# Patient Record
Sex: MALE | Race: OTHER | HISPANIC OR LATINO
[De-identification: names, ages, dates, MRNs, and addresses within clinical notes are randomized per-mention and may not be internally consistent; named-entity substitution may affect disease eponyms.]

---

## 2024-10-11 PROBLEM — Z00.00 ENCOUNTER FOR PREVENTIVE HEALTH EXAMINATION: Status: ACTIVE | Noted: 2024-10-11

## 2024-10-23 ENCOUNTER — RESULT REVIEW (OUTPATIENT)
Age: 69
End: 2024-10-23

## 2024-10-23 ENCOUNTER — APPOINTMENT (OUTPATIENT)
Dept: SPINE | Facility: CLINIC | Age: 69
End: 2024-10-23
Payer: MEDICARE

## 2024-10-23 ENCOUNTER — OUTPATIENT (OUTPATIENT)
Dept: OUTPATIENT SERVICES | Facility: HOSPITAL | Age: 69
LOS: 1 days | End: 2024-10-23
Payer: MEDICAID

## 2024-10-23 VITALS
SYSTOLIC BLOOD PRESSURE: 123 MMHG | DIASTOLIC BLOOD PRESSURE: 69 MMHG | HEIGHT: 69 IN | TEMPERATURE: 97.5 F | RESPIRATION RATE: 18 BRPM | BODY MASS INDEX: 25.48 KG/M2 | WEIGHT: 172 LBS | HEART RATE: 85 BPM | OXYGEN SATURATION: 98 %

## 2024-10-23 DIAGNOSIS — M43.17 SPONDYLOLISTHESIS, LUMBOSACRAL REGION: ICD-10-CM

## 2024-10-23 PROCEDURE — 99203 OFFICE O/P NEW LOW 30 MIN: CPT

## 2024-10-23 PROCEDURE — 72083 X-RAY EXAM ENTIRE SPI 4/5 VW: CPT | Mod: 26

## 2024-10-23 PROCEDURE — 72083 X-RAY EXAM ENTIRE SPI 4/5 VW: CPT

## 2024-12-27 ENCOUNTER — APPOINTMENT (OUTPATIENT)
Dept: MRI IMAGING | Facility: HOSPITAL | Age: 69
End: 2024-12-27

## 2024-12-27 ENCOUNTER — APPOINTMENT (OUTPATIENT)
Dept: CT IMAGING | Facility: HOSPITAL | Age: 69
End: 2024-12-27

## 2024-12-27 ENCOUNTER — OUTPATIENT (OUTPATIENT)
Dept: OUTPATIENT SERVICES | Facility: HOSPITAL | Age: 69
LOS: 1 days | End: 2024-12-27
Payer: MEDICAID

## 2024-12-27 PROCEDURE — 72131 CT LUMBAR SPINE W/O DYE: CPT

## 2024-12-27 PROCEDURE — 72148 MRI LUMBAR SPINE W/O DYE: CPT

## 2024-12-27 PROCEDURE — 72148 MRI LUMBAR SPINE W/O DYE: CPT | Mod: 26

## 2024-12-27 PROCEDURE — 72131 CT LUMBAR SPINE W/O DYE: CPT | Mod: 26

## 2024-12-27 PROCEDURE — 72082 X-RAY EXAM ENTIRE SPI 2/3 VW: CPT | Mod: 26

## 2024-12-27 PROCEDURE — 72082 X-RAY EXAM ENTIRE SPI 2/3 VW: CPT

## 2025-01-06 DIAGNOSIS — M43.16 SPONDYLOLISTHESIS, LUMBAR REGION: ICD-10-CM

## 2025-01-06 DIAGNOSIS — M51.369 OTHER INTERVERTEBRAL DISC DEGENERATION, LUMBAR REGION WITHOUT MENTION OF LUMBAR BACK PAIN OR LOWER EXTREMITY PAIN: ICD-10-CM

## 2025-01-06 DIAGNOSIS — M40.56 LORDOSIS, UNSPECIFIED, LUMBAR REGION: ICD-10-CM

## 2025-01-06 DIAGNOSIS — M48.07 SPINAL STENOSIS, LUMBOSACRAL REGION: ICD-10-CM

## 2025-01-06 DIAGNOSIS — M47.816 SPONDYLOSIS WITHOUT MYELOPATHY OR RADICULOPATHY, LUMBAR REGION: ICD-10-CM

## 2025-01-06 DIAGNOSIS — M51.26 OTHER INTERVERTEBRAL DISC DISPLACEMENT, LUMBAR REGION: ICD-10-CM

## 2025-01-06 DIAGNOSIS — M99.84 OTHER BIOMECHANICAL LESIONS OF SACRAL REGION: ICD-10-CM

## 2025-01-06 DIAGNOSIS — M48.061 SPINAL STENOSIS, LUMBAR REGION WITHOUT NEUROGENIC CLAUDICATION: ICD-10-CM

## 2025-01-06 DIAGNOSIS — M99.83 OTHER BIOMECHANICAL LESIONS OF LUMBAR REGION: ICD-10-CM

## 2025-01-06 DIAGNOSIS — N20.0 CALCULUS OF KIDNEY: ICD-10-CM

## 2025-01-06 DIAGNOSIS — Z01.818 ENCOUNTER FOR OTHER PREPROCEDURAL EXAMINATION: ICD-10-CM

## 2025-01-06 DIAGNOSIS — M43.17 SPONDYLOLISTHESIS, LUMBOSACRAL REGION: ICD-10-CM

## 2025-01-31 VITALS
SYSTOLIC BLOOD PRESSURE: 147 MMHG | OXYGEN SATURATION: 99 % | DIASTOLIC BLOOD PRESSURE: 90 MMHG | WEIGHT: 195.77 LBS | HEART RATE: 78 BPM | RESPIRATION RATE: 17 BRPM | TEMPERATURE: 98 F | HEIGHT: 71 IN

## 2025-02-02 RX ORDER — POVIDONE-IODINE 0.01 ML/1
1 SWAB TOPICAL ONCE
Refills: 0 | Status: COMPLETED | OUTPATIENT
Start: 2025-02-03 | End: 2025-02-03

## 2025-02-03 ENCOUNTER — INPATIENT (INPATIENT)
Facility: HOSPITAL | Age: 70
LOS: 3 days | Discharge: HOME HEALTH | DRG: 402 | End: 2025-02-07
Attending: NEUROLOGICAL SURGERY | Admitting: NEUROLOGICAL SURGERY
Payer: MEDICAID

## 2025-02-03 ENCOUNTER — TRANSCRIPTION ENCOUNTER (OUTPATIENT)
Age: 70
End: 2025-02-03

## 2025-02-03 ENCOUNTER — APPOINTMENT (OUTPATIENT)
Dept: SPINE | Facility: HOSPITAL | Age: 70
End: 2025-02-03

## 2025-02-03 DIAGNOSIS — Z98.890 OTHER SPECIFIED POSTPROCEDURAL STATES: Chronic | ICD-10-CM

## 2025-02-03 LAB
ANION GAP SERPL CALC-SCNC: 10 MMOL/L — SIGNIFICANT CHANGE UP (ref 5–17)
BASOPHILS # BLD AUTO: 0.01 K/UL — SIGNIFICANT CHANGE UP (ref 0–0.2)
BASOPHILS NFR BLD AUTO: 0.1 % — SIGNIFICANT CHANGE UP (ref 0–2)
BLD GP AB SCN SERPL QL: NEGATIVE — SIGNIFICANT CHANGE UP
BUN SERPL-MCNC: 17 MG/DL — SIGNIFICANT CHANGE UP (ref 7–23)
CALCIUM SERPL-MCNC: 8.6 MG/DL — SIGNIFICANT CHANGE UP (ref 8.4–10.5)
CHLORIDE SERPL-SCNC: 103 MMOL/L — SIGNIFICANT CHANGE UP (ref 96–108)
CO2 SERPL-SCNC: 23 MMOL/L — SIGNIFICANT CHANGE UP (ref 22–31)
CREAT SERPL-MCNC: 0.85 MG/DL — SIGNIFICANT CHANGE UP (ref 0.5–1.3)
EGFR: 95 ML/MIN/1.73M2 — SIGNIFICANT CHANGE UP
EOSINOPHIL # BLD AUTO: 0.05 K/UL — SIGNIFICANT CHANGE UP (ref 0–0.5)
EOSINOPHIL NFR BLD AUTO: 0.7 % — SIGNIFICANT CHANGE UP (ref 0–6)
GLUCOSE SERPL-MCNC: 145 MG/DL — HIGH (ref 70–99)
HCT VFR BLD CALC: 38.6 % — LOW (ref 39–50)
HGB BLD-MCNC: 13.6 G/DL — SIGNIFICANT CHANGE UP (ref 13–17)
IMM GRANULOCYTES NFR BLD AUTO: 0.5 % — SIGNIFICANT CHANGE UP (ref 0–0.9)
LYMPHOCYTES # BLD AUTO: 0.6 K/UL — LOW (ref 1–3.3)
LYMPHOCYTES # BLD AUTO: 7.9 % — LOW (ref 13–44)
MCHC RBC-ENTMCNC: 31.6 PG — SIGNIFICANT CHANGE UP (ref 27–34)
MCHC RBC-ENTMCNC: 35.2 G/DL — SIGNIFICANT CHANGE UP (ref 32–36)
MCV RBC AUTO: 89.6 FL — SIGNIFICANT CHANGE UP (ref 80–100)
MONOCYTES # BLD AUTO: 0.17 K/UL — SIGNIFICANT CHANGE UP (ref 0–0.9)
MONOCYTES NFR BLD AUTO: 2.2 % — SIGNIFICANT CHANGE UP (ref 2–14)
NEUTROPHILS # BLD AUTO: 6.76 K/UL — SIGNIFICANT CHANGE UP (ref 1.8–7.4)
NEUTROPHILS NFR BLD AUTO: 88.6 % — HIGH (ref 43–77)
NRBC # BLD: 0 /100 WBCS — SIGNIFICANT CHANGE UP (ref 0–0)
NRBC BLD-RTO: 0 /100 WBCS — SIGNIFICANT CHANGE UP (ref 0–0)
PLATELET # BLD AUTO: 158 K/UL — SIGNIFICANT CHANGE UP (ref 150–400)
POTASSIUM SERPL-MCNC: SIGNIFICANT CHANGE UP MMOL/L (ref 3.5–5.3)
POTASSIUM SERPL-SCNC: SIGNIFICANT CHANGE UP MMOL/L (ref 3.5–5.3)
RBC # BLD: 4.31 M/UL — SIGNIFICANT CHANGE UP (ref 4.2–5.8)
RBC # FLD: 13.1 % — SIGNIFICANT CHANGE UP (ref 10.3–14.5)
RH IG SCN BLD-IMP: NEGATIVE — SIGNIFICANT CHANGE UP
SODIUM SERPL-SCNC: 136 MMOL/L — SIGNIFICANT CHANGE UP (ref 135–145)
WBC # BLD: 7.63 K/UL — SIGNIFICANT CHANGE UP (ref 3.8–10.5)
WBC # FLD AUTO: 7.63 K/UL — SIGNIFICANT CHANGE UP (ref 3.8–10.5)

## 2025-02-03 PROCEDURE — 22214 INCIS 1 VERTEBRAL SEG LUMBAR: CPT

## 2025-02-03 PROCEDURE — 22840 INSERT SPINE FIXATION DEVICE: CPT

## 2025-02-03 PROCEDURE — 22633 ARTHRD CMBN 1NTRSPC LUMBAR: CPT

## 2025-02-03 PROCEDURE — 22853 INSJ BIOMECHANICAL DEVICE: CPT

## 2025-02-03 DEVICE — FLOSEAL NT 5ML: Type: IMPLANTABLE DEVICE | Status: FUNCTIONAL

## 2025-02-03 DEVICE — ROD T1 30MM: Type: IMPLANTABLE DEVICE | Status: FUNCTIONAL

## 2025-02-03 DEVICE — SURGIFOAM PAD 8CM X 12.5CM X 10MM (100): Type: IMPLANTABLE DEVICE | Status: FUNCTIONAL

## 2025-02-03 DEVICE — SCREW SOLERA 7.5X50MM: Type: IMPLANTABLE DEVICE | Status: FUNCTIONAL

## 2025-02-03 DEVICE — SCREW SET: Type: IMPLANTABLE DEVICE | Status: FUNCTIONAL

## 2025-02-03 DEVICE — GRAFT I-FACTOR PUTTY 5CC: Type: IMPLANTABLE DEVICE | Status: FUNCTIONAL

## 2025-02-03 DEVICE — SCREW MAS 6.5X45MM: Type: IMPLANTABLE DEVICE | Status: FUNCTIONAL

## 2025-02-03 DEVICE — IMPLANTABLE DEVICE: Type: IMPLANTABLE DEVICE | Status: FUNCTIONAL

## 2025-02-03 RX ORDER — PREGABALIN CAPSULES, CV 225 MG/1
50 CAPSULE ORAL THREE TIMES A DAY
Refills: 0 | Status: DISCONTINUED | OUTPATIENT
Start: 2025-02-03 | End: 2025-02-05

## 2025-02-03 RX ORDER — DM/PSEUDOEPHED/ACETAMINOPHEN 10-30-250
15 CAPSULE ORAL ONCE
Refills: 0 | Status: DISCONTINUED | OUTPATIENT
Start: 2025-02-03 | End: 2025-02-03

## 2025-02-03 RX ORDER — AMLODIPINE BESYLATE 5 MG
10 TABLET ORAL DAILY
Refills: 0 | Status: DISCONTINUED | OUTPATIENT
Start: 2025-02-04 | End: 2025-02-07

## 2025-02-03 RX ORDER — APREPITANT 40 MG/1
40 CAPSULE ORAL ONCE
Refills: 0 | Status: COMPLETED | OUTPATIENT
Start: 2025-02-03 | End: 2025-02-03

## 2025-02-03 RX ORDER — INSULIN LISPRO 100/ML
VIAL (ML) SUBCUTANEOUS
Refills: 0 | Status: DISCONTINUED | OUTPATIENT
Start: 2025-02-03 | End: 2025-02-03

## 2025-02-03 RX ORDER — GLUCAGON 3 MG/1
1 POWDER NASAL ONCE
Refills: 0 | Status: DISCONTINUED | OUTPATIENT
Start: 2025-02-03 | End: 2025-02-03

## 2025-02-03 RX ORDER — ACETAMINOPHEN 160 MG/5ML
1000 SUSPENSION ORAL ONCE
Refills: 0 | Status: COMPLETED | OUTPATIENT
Start: 2025-02-03 | End: 2025-02-03

## 2025-02-03 RX ORDER — OXYCODONE HYDROCHLORIDE 30 MG/1
5 TABLET ORAL EVERY 4 HOURS
Refills: 0 | Status: DISCONTINUED | OUTPATIENT
Start: 2025-02-03 | End: 2025-02-07

## 2025-02-03 RX ORDER — OXYCODONE HYDROCHLORIDE 30 MG/1
10 TABLET ORAL EVERY 4 HOURS
Refills: 0 | Status: DISCONTINUED | OUTPATIENT
Start: 2025-02-03 | End: 2025-02-07

## 2025-02-03 RX ORDER — DM/PSEUDOEPHED/ACETAMINOPHEN 10-30-250
25 CAPSULE ORAL ONCE
Refills: 0 | Status: DISCONTINUED | OUTPATIENT
Start: 2025-02-03 | End: 2025-02-03

## 2025-02-03 RX ORDER — MECOBAL/LEVOMEFOLAT CA/B6 PHOS 2-3-35 MG
1 TABLET ORAL DAILY
Refills: 0 | Status: DISCONTINUED | OUTPATIENT
Start: 2025-02-03 | End: 2025-02-07

## 2025-02-03 RX ORDER — METHOCARBAMOL 500 MG
500 TABLET ORAL EVERY 8 HOURS
Refills: 0 | Status: DISCONTINUED | OUTPATIENT
Start: 2025-02-03 | End: 2025-02-05

## 2025-02-03 RX ORDER — ANTISEPTIC SURGICAL SCRUB 0.04 MG/ML
1 SOLUTION TOPICAL ONCE
Refills: 0 | Status: COMPLETED | OUTPATIENT
Start: 2025-02-03 | End: 2025-02-03

## 2025-02-03 RX ORDER — ACETAMINOPHEN 160 MG/5ML
1000 SUSPENSION ORAL EVERY 8 HOURS
Refills: 0 | Status: DISCONTINUED | OUTPATIENT
Start: 2025-02-03 | End: 2025-02-06

## 2025-02-03 RX ORDER — POLYETHYLENE GLYCOL 3350 17 G/17G
17 POWDER, FOR SOLUTION ORAL DAILY
Refills: 0 | Status: DISCONTINUED | OUTPATIENT
Start: 2025-02-03 | End: 2025-02-05

## 2025-02-03 RX ORDER — BACTERIOSTATIC SODIUM CHLORIDE 0.9 %
1000 VIAL (ML) INJECTION
Refills: 0 | Status: DISCONTINUED | OUTPATIENT
Start: 2025-02-03 | End: 2025-02-04

## 2025-02-03 RX ORDER — SODIUM CHLORIDE 9 G/ML
1000 INJECTION, SOLUTION INTRAVENOUS
Refills: 0 | Status: DISCONTINUED | OUTPATIENT
Start: 2025-02-03 | End: 2025-02-03

## 2025-02-03 RX ORDER — PANTOPRAZOLE 20 MG/1
40 TABLET, DELAYED RELEASE ORAL
Refills: 0 | Status: DISCONTINUED | OUTPATIENT
Start: 2025-02-03 | End: 2025-02-05

## 2025-02-03 RX ORDER — SENNOSIDES 8.6 MG
2 TABLET ORAL
Refills: 0 | Status: DISCONTINUED | OUTPATIENT
Start: 2025-02-03 | End: 2025-02-07

## 2025-02-03 RX ORDER — INSULIN LISPRO 100/ML
VIAL (ML) SUBCUTANEOUS
Refills: 0 | Status: DISCONTINUED | OUTPATIENT
Start: 2025-02-03 | End: 2025-02-07

## 2025-02-03 RX ORDER — ONDANSETRON 4 MG/1
4 TABLET, ORALLY DISINTEGRATING ORAL EVERY 6 HOURS
Refills: 0 | Status: DISCONTINUED | OUTPATIENT
Start: 2025-02-03 | End: 2025-02-06

## 2025-02-03 RX ORDER — CEFAZOLIN SODIUM IN 0.9 % NACL 2 G/10 ML
2000 SYRINGE (ML) INTRAVENOUS EVERY 8 HOURS
Refills: 0 | Status: COMPLETED | OUTPATIENT
Start: 2025-02-03 | End: 2025-02-04

## 2025-02-03 RX ORDER — HYDROMORPHONE HYDROCHLORIDE 4 MG/ML
0.5 INJECTION, SOLUTION INTRAMUSCULAR; INTRAVENOUS; SUBCUTANEOUS
Refills: 0 | Status: DISCONTINUED | OUTPATIENT
Start: 2025-02-03 | End: 2025-02-04

## 2025-02-03 RX ORDER — DM/PSEUDOEPHED/ACETAMINOPHEN 10-30-250
12.5 CAPSULE ORAL ONCE
Refills: 0 | Status: DISCONTINUED | OUTPATIENT
Start: 2025-02-03 | End: 2025-02-03

## 2025-02-03 RX ADMIN — Medication 100 MILLIGRAM(S): at 19:56

## 2025-02-03 RX ADMIN — APREPITANT 40 MILLIGRAM(S): 40 CAPSULE ORAL at 10:24

## 2025-02-03 RX ADMIN — OXYCODONE HYDROCHLORIDE 5 MILLIGRAM(S): 30 TABLET ORAL at 21:40

## 2025-02-03 RX ADMIN — HYDROMORPHONE HYDROCHLORIDE 0.5 MILLIGRAM(S): 4 INJECTION, SOLUTION INTRAMUSCULAR; INTRAVENOUS; SUBCUTANEOUS at 18:43

## 2025-02-03 RX ADMIN — OXYCODONE HYDROCHLORIDE 5 MILLIGRAM(S): 30 TABLET ORAL at 20:40

## 2025-02-03 RX ADMIN — ACETAMINOPHEN 1000 MILLIGRAM(S): 160 SUSPENSION ORAL at 22:12

## 2025-02-03 RX ADMIN — PREGABALIN CAPSULES, CV 50 MILLIGRAM(S): 225 CAPSULE ORAL at 22:17

## 2025-02-03 RX ADMIN — Medication 1 TABLET(S): at 19:16

## 2025-02-03 RX ADMIN — ACETAMINOPHEN 1000 MILLIGRAM(S): 160 SUSPENSION ORAL at 10:24

## 2025-02-03 RX ADMIN — ANTISEPTIC SURGICAL SCRUB 1 APPLICATION(S): 0.04 SOLUTION TOPICAL at 10:10

## 2025-02-03 RX ADMIN — Medication 90 MILLILITER(S): at 16:44

## 2025-02-03 RX ADMIN — OXYCODONE HYDROCHLORIDE 10 MILLIGRAM(S): 30 TABLET ORAL at 17:37

## 2025-02-03 RX ADMIN — HYDROMORPHONE HYDROCHLORIDE 0.5 MILLIGRAM(S): 4 INJECTION, SOLUTION INTRAMUSCULAR; INTRAVENOUS; SUBCUTANEOUS at 16:22

## 2025-02-03 RX ADMIN — POVIDONE-IODINE 1 APPLICATION(S): 0.01 SWAB TOPICAL at 10:40

## 2025-02-03 RX ADMIN — Medication 2 TABLET(S): at 22:12

## 2025-02-03 RX ADMIN — HYDROMORPHONE HYDROCHLORIDE 0.5 MILLIGRAM(S): 4 INJECTION, SOLUTION INTRAMUSCULAR; INTRAVENOUS; SUBCUTANEOUS at 16:59

## 2025-02-03 RX ADMIN — OXYCODONE HYDROCHLORIDE 10 MILLIGRAM(S): 30 TABLET ORAL at 18:07

## 2025-02-03 RX ADMIN — HYDROMORPHONE HYDROCHLORIDE 0.5 MILLIGRAM(S): 4 INJECTION, SOLUTION INTRAMUSCULAR; INTRAVENOUS; SUBCUTANEOUS at 16:37

## 2025-02-03 RX ADMIN — Medication 500 MILLIGRAM(S): at 22:12

## 2025-02-03 RX ADMIN — Medication 4: at 22:33

## 2025-02-03 RX ADMIN — HYDROMORPHONE HYDROCHLORIDE 0.5 MILLIGRAM(S): 4 INJECTION, SOLUTION INTRAMUSCULAR; INTRAVENOUS; SUBCUTANEOUS at 18:26

## 2025-02-03 RX ADMIN — POLYETHYLENE GLYCOL 3350 17 GRAM(S): 17 POWDER, FOR SOLUTION ORAL at 19:16

## 2025-02-03 RX ADMIN — HYDROMORPHONE HYDROCHLORIDE 0.5 MILLIGRAM(S): 4 INJECTION, SOLUTION INTRAMUSCULAR; INTRAVENOUS; SUBCUTANEOUS at 16:44

## 2025-02-03 RX ADMIN — PANTOPRAZOLE 40 MILLIGRAM(S): 20 TABLET, DELAYED RELEASE ORAL at 19:16

## 2025-02-03 NOTE — PHYSICAL THERAPY INITIAL EVALUATION ADULT - PERTINENT HX OF CURRENT PROBLEM, REHAB EVAL
Pt is a 70 yo male with PMHx HTN, GERD, DM2 diet-controlled with L5-S1 Spondylolisthesis; s/p L5-S1 laminectomy, TLIF, PSF on 2/3/25.

## 2025-02-03 NOTE — PHYSICAL THERAPY INITIAL EVALUATION ADULT - LIGHT TOUCH SENSATION, LLE, REHAB EVAL
intact except pt complains slight numbness at lateral aspect of thigh (Pt reports this is chronic condition)/mild impairment

## 2025-02-03 NOTE — PHYSICAL THERAPY INITIAL EVALUATION ADULT - GENERAL OBSERVATIONS, REHAB EVAL
Pt received semi supine in bed with +IV, +EKG, +b/l SCDs, +[place], +HV x 1, + JESUS drain, +NC~2L, NAD. Pt left as found, NAD, drains intact, AARON Gutierrez made aware.

## 2025-02-03 NOTE — PHYSICAL THERAPY INITIAL EVALUATION ADULT - LIGHT TOUCH SENSATION, RLE, REHAB EVAL
intact except Lateral aspect of thigh and posterior plantar aspect of the foot numbness(pt stated that this is chronic)/mild impairment

## 2025-02-03 NOTE — H&P ADULT - HISTORY OF PRESENT ILLNESS
Taken from outpatient neurosurgery note " 68 y/o male with hx of HTN coming in for lower back pain. Pain radiating to bilateral posterior lower extremities (L>R). Reporting weakness of the left lower extremity. "     Taken from outpatient neurosurgery note " 68 y/o male with hx of HTN coming in for lower back pain. Pain radiating to bilateral posterior lower extremities (L>R). Reporting weakness of the left lower extremity. "  ,   68 y/o male with PMHx DM diet controlled (HgbA1c 6.7%), HTN, GERD, presents for spine surgery.  He reports severe back pain and left greater than right leg pain.  He ambulates with a cane.  He reports numbess into both legs and reports left leg weakness when trying to walk.  He denies bowel or bladder changes, saddle anesthesia.  He has not taken any pain medication in the past month because it hasn't been helping.

## 2025-02-03 NOTE — H&P ADULT - NSHPPHYSICALEXAM_GEN_ALL_CORE
Constitutional: 68 y/o male awake, alert in no acute distress.  Eyes:  Sclera anicteric, conjunctiva noninjected.  ENMT: Oropharyngeal mucosa moist, pink. Tongue midline.    Respiratory: Clear to auscultation bilaterally.  No rales, rhonchi, wheezes.  Cardiovascular: Regular rate and rhythm.  S1, S2 heard.  Gastrointestinal:  Soft, nontender, nondistended.  +BS.  Genitourinary:  Deferred.  Rectal: Deferred.  Vascular: Extremities warm, no ulcers, no discoloration of skin.   Neurological: Gen: AA&O x 3, conversant, appropriate.      CN II-XII grossly intact.    Motor: SHI x 4, 5/5 throughout UE/LE.    Sens: Sensation intact to light touch throughout.    Plantar downgoing bilaterally.  No clonus.      No pronator drift, no dysmetria.  Skin: Warm, dry, no erythema. Constitutional: 68 y/o male awake, alert in no acute distress.  Eyes:  Sclera anicteric, conjunctiva noninjected.  ENMT: Oropharyngeal mucosa moist, pink. Tongue midline.    Respiratory: Clear to auscultation bilaterally.  No rales, rhonchi, wheezes.  Cardiovascular: Regular rate and rhythm.  S1, S2 heard.  Gastrointestinal:  Soft, nontender, nondistended.  +BS.  Genitourinary:  Deferred.  Rectal: Deferred.  Vascular: Extremities warm, no ulcers, no discoloration of skin.   Neurological: Gen: AA&O x 3, conversant, appropriate.      CN II-XII grossly intact.    Motor: SHI x 4, 5/5 throughout UE/LE except bilateral LE 4+/5 throughout.    Sens: Sensation intact to light touch throughout.    Plantar downgoing bilaterally.  No clonus.      No pronator drift, no dysmetria.  Skin: Warm, dry, no erythema.

## 2025-02-03 NOTE — PROGRESS NOTE ADULT - ATTENDING COMMENTS
I have personally reviewed the above clinical note and all of its components and:  [ x] agree with the above assessment and plan without modifications.  [ ] agree with the above with modifications made to the assessment and/or plan of care.  [ ] disagree with the above with significant modifications as listed below:

## 2025-02-03 NOTE — PROGRESS NOTE ADULT - SUBJECTIVE AND OBJECTIVE BOX
NEUROSURGERY POST OP NOTE:    POD# 0 S/P posterior lumbar laminectomy with fusion    S: Patient states he is in a lot of pain. He is unable to describe or rate the pain, but he says it is mostly in his back. The patient denies fevers, chills, dizziness or nausea.      T(C): 36.4 (02-03-25 @ 15:49), Max: 36.5 (02-03-25 @ 10:02)  HR: 55 (02-03-25 @ 16:49) (55 - 78)  BP: 124/76 (02-03-25 @ 16:49) (120/76 - 147/90)  RR: 18 (02-03-25 @ 16:49) (13 - 22)  SpO2: 93% (02-03-25 @ 16:49) (93% - 100%)      02-03-25 @ 07:01  -  02-03-25 @ 17:14  --------------------------------------------------------  IN: 180 mL / OUT: 100 mL / NET: 80 mL        acetaminophen     Tablet .. 1000 milliGRAM(s) Oral every 8 hours  ceFAZolin   IVPB 2000 milliGRAM(s) IV Intermittent every 8 hours  dextrose 5%. 1000 milliLiter(s) IV Continuous <Continuous>  dextrose 5%. 1000 milliLiter(s) IV Continuous <Continuous>  dextrose 50% Injectable 25 Gram(s) IV Push once  dextrose 50% Injectable 12.5 Gram(s) IV Push once  dextrose 50% Injectable 25 Gram(s) IV Push once  dextrose Oral Gel 15 Gram(s) Oral once PRN  glucagon  Injectable 1 milliGRAM(s) IntraMuscular once  HYDROmorphone  Injectable 0.5 milliGRAM(s) IV Push every 15 minutes PRN  insulin lispro (ADMELOG) corrective regimen sliding scale   SubCutaneous three times a day before meals  methocarbamol 500 milliGRAM(s) Oral every 8 hours  multivitamin 1 Tablet(s) Oral daily  ondansetron   Disintegrating Tablet 4 milliGRAM(s) Oral every 6 hours  oxyCODONE    IR 10 milliGRAM(s) Oral every 4 hours PRN  oxyCODONE    IR 5 milliGRAM(s) Oral every 4 hours PRN  pantoprazole    Tablet 40 milliGRAM(s) Oral before breakfast  polyethylene glycol 3350 17 Gram(s) Oral daily  pregabalin 50 milliGRAM(s) Oral three times a day  senna 2 Tablet(s) Oral two times a day  sodium chloride 0.9%. 1000 milliLiter(s) IV Continuous <Continuous>      RADIOLOGY:     Exam:General - NAD, Alert and oriented x 3,   Eyes - PERRLA, EOM intact  Neck - No lymphadenopathy  Cardiovascular - RRR no m/r/g, no JVD  Lungs - Clear to auscltation, no use of acessory muscles, no crackles or wheezes.  Skin - No rashes, skin warm and dry, no erythematous areas. Incision near L4/5 does not show signs of infection.  Abdomen - Normal bowel sounds, abdomen soft and nontender  Genito Urinary – Genital exam not performed since complaints not related.  Extremeties - No edema, cyanosis or clubbing  Neurological – No focal deficits or clonus. UE EDGAR 5/5 strength, LE EDGAR hip flexion 3/5, knee extension EDGAR 4/5, and rest of LE EDGAR exam 5/5. Sensation equal and intact.    WOUND/DRAINS: 1 posterior drain at the incision site. Minimal output ~5cc    DEVICES:       Assessment: 68 y/o male with PMHx HTN, GERD, DM2 diet-controlled presents with lower back pain with pain radiating to bilateral posterior lower extremities (L>R) and associated LLE weakness. L5-S1 laminectomy and TLIF, now s/p PSF.    Plan:   Neuro:   - neuro/vitals q4hr  - pain control with Oxy, Dilaudid, Robaxin prn  - pending post op xrays  - x1 deep HMV, x1 superficial JESUS per plastics, monitor output   - No heavy lifting for next 6 weeks    Cards:  - SBP <160  - hx HTN: c/w home amlodipine 5 mg, valsartan 160 mg     RA:  - RA  - IS     GI:  - ADAT  - bowel regimen, pending BM  - hx GERD: c/w home omeprazole (need to confirm if pt still takes)     Renal:  - IVF while ADAT  - +rodriguez -> dc in AM  - Monitor In's and Out's. Has not voided yet.    Endo:  - hx T2DM: ISS  - f/u A1C    Heme:  - SCDs, hold SQL i/s/o fresh post op    ID:  - afebrile  - post op ancef    Dispo: regional, full code, pending PT/OT    Discussed with Dr. Pizarro        Assessment:  Present when checked    []  GCS  E   V  M     Heart Failure: []Acute, [] acute on chronic , []chronic  Heart Failure:  [] Diastolic (HFpEF), [] Systolic (HFrEF), []Combined (HFpEF and HFrEF), [] RHF, [] Pulm HTN, [] Other    [] JAYA, [] ATN, [] AIN, [] other  [] CKD1, [] CKD2, [] CKD 3, [] CKD 4, [] CKD 5, []ESRD    Encephalopathy: [] Metabolic, [] Hepatic, [] toxic, [] Neurological, [] Other    Abnormal Nurtitional Status: [] malnurtition (see nutrition note), [ ]underweight: BMI < 19, [] morbid obesity: BMI >40, [] Cachexia    [] Sepsis  [] hypovolemic shock,[] cardiogenic shock, [] hemorrhagic shock, [] neuogenic shock  [] Acute Respiratory Failure  []Cerebral edema, [] Brain compression/ herniation,   [] Functional quadriplegia  [] Acute blood loss anemia       NEUROSURGERY POST OP NOTE:    POD# 0 S/P L5-S1 laminectomy, TLIF, PSF    S: Patient states he is in a lot of pain. He is unable to describe or rate the pain, but he says it is mostly in his back. The patient denies fevers, chills, SOB, dizziness or nausea.      T(C): 36.4 (02-03-25 @ 15:49), Max: 36.5 (02-03-25 @ 10:02)  HR: 55 (02-03-25 @ 16:49) (55 - 78)  BP: 124/76 (02-03-25 @ 16:49) (120/76 - 147/90)  RR: 18 (02-03-25 @ 16:49) (13 - 22)  SpO2: 93% (02-03-25 @ 16:49) (93% - 100%)      02-03-25 @ 07:01  -  02-03-25 @ 17:14  --------------------------------------------------------  IN: 180 mL / OUT: 100 mL / NET: 80 mL        acetaminophen     Tablet .. 1000 milliGRAM(s) Oral every 8 hours  ceFAZolin   IVPB 2000 milliGRAM(s) IV Intermittent every 8 hours  dextrose 5%. 1000 milliLiter(s) IV Continuous <Continuous>  dextrose 5%. 1000 milliLiter(s) IV Continuous <Continuous>  dextrose 50% Injectable 25 Gram(s) IV Push once  dextrose 50% Injectable 12.5 Gram(s) IV Push once  dextrose 50% Injectable 25 Gram(s) IV Push once  dextrose Oral Gel 15 Gram(s) Oral once PRN  glucagon  Injectable 1 milliGRAM(s) IntraMuscular once  HYDROmorphone  Injectable 0.5 milliGRAM(s) IV Push every 15 minutes PRN  insulin lispro (ADMELOG) corrective regimen sliding scale   SubCutaneous three times a day before meals  methocarbamol 500 milliGRAM(s) Oral every 8 hours  multivitamin 1 Tablet(s) Oral daily  ondansetron   Disintegrating Tablet 4 milliGRAM(s) Oral every 6 hours  oxyCODONE    IR 10 milliGRAM(s) Oral every 4 hours PRN  oxyCODONE    IR 5 milliGRAM(s) Oral every 4 hours PRN  pantoprazole    Tablet 40 milliGRAM(s) Oral before breakfast  polyethylene glycol 3350 17 Gram(s) Oral daily  pregabalin 50 milliGRAM(s) Oral three times a day  senna 2 Tablet(s) Oral two times a day  sodium chloride 0.9%. 1000 milliLiter(s) IV Continuous <Continuous>      RADIOLOGY: Standing X-rays prior to d/c    Exam:General - NAD, Alert and oriented x 3,   Eyes - PERRLA, EOM intact  Neck - No lymphadenopathy  Cardiovascular - RRR no m/r/g, no JVD  Lungs - Clear to auscltation, no use of accessory muscles, no crackles or wheezes.  Skin - No rashes, skin warm and dry, no erythematous areas. Incision near L4/5 does not show signs of infection.  Abdomen - Normal bowel sounds, abdomen soft and nontender  Genito Urinary – Genital exam not performed since complaints not related.  Extremeties - No edema, cyanosis or clubbing  Neurological – No focal deficits or clonus. UE and LE EDGAR 5/5 strength with the exception of EDGAR hip flexion 3/5 and EDGAR knee extension 4/5. Sensation equal and intact.    WOUND/DRAINS: 1 posterior drain at the incision site. Minimal output.    DEVICES:       Assessment: 68 y/o male with PMHx HTN, GERD, DM2 diet-controlled presents with lower back pain with pain radiating to bilateral posterior lower extremities (L>R) and associated LLE weakness. L5-S1 laminectomy and TLIF, now s/p PSF.    Plan:   Neuro:   - neuro/vitals q1hr  - pain control with Oxy, Dilaudid, Robaxin prn  - pending post op xrays  - x1 deep HMV, x1 superficial JESUS per plastics, monitor output   - No heavy lifting for next 6 weeks    Cards:  - SBP <160  - hx HTN: c/w home amlodipine 5 mg, valsartan 160 mg     RA:  - RA  - IS     GI:  - ADAT  - bowel regimen, pending BM  - hx GERD: c/w home omeprazole (need to confirm if pt still takes)     Renal:  - IVF while ADAT  - +rodriguez -> dc in AM  - Monitor In's and Out's. Has not voided yet.    Endo:  - hx T2DM: ISS  - f/u A1C    Heme:  - SCDs, hold SQL i/s/o fresh post op    ID:  - afebrile  - post op ancef    Dispo: regional, full code, pending PT/OT    Discussed with Dr. Pizarro        Assessment:  Present when checked    []  GCS  E   V  M     Heart Failure: []Acute, [] acute on chronic , []chronic  Heart Failure:  [] Diastolic (HFpEF), [] Systolic (HFrEF), []Combined (HFpEF and HFrEF), [] RHF, [] Pulm HTN, [] Other    [] JAYA, [] ATN, [] AIN, [] other  [] CKD1, [] CKD2, [] CKD 3, [] CKD 4, [] CKD 5, []ESRD    Encephalopathy: [] Metabolic, [] Hepatic, [] toxic, [] Neurological, [] Other    Abnormal Nurtitional Status: [] malnurtition (see nutrition note), [ ]underweight: BMI < 19, [] morbid obesity: BMI >40, [] Cachexia    [] Sepsis  [] hypovolemic shock,[] cardiogenic shock, [] hemorrhagic shock, [] neuogenic shock  [] Acute Respiratory Failure  []Cerebral edema, [] Brain compression/ herniation,   [] Functional quadriplegia  [] Acute blood loss anemia       NEUROSURGERY POST OP NOTE:    POD# 0 S/P L5-S1 laminectomy, TLIF, PSF    S: Patient states he is in a lot of pain. He is unable to describe or rate the pain, but he says it is mostly in his back. The patient denies fevers, chills, SOB, dizziness or nausea.      T(C): 36.4 (02-03-25 @ 15:49), Max: 36.5 (02-03-25 @ 10:02)  HR: 55 (02-03-25 @ 16:49) (55 - 78)  BP: 124/76 (02-03-25 @ 16:49) (120/76 - 147/90)  RR: 18 (02-03-25 @ 16:49) (13 - 22)  SpO2: 93% (02-03-25 @ 16:49) (93% - 100%)      02-03-25 @ 07:01  -  02-03-25 @ 17:14  --------------------------------------------------------  IN: 180 mL / OUT: 100 mL / NET: 80 mL        acetaminophen     Tablet .. 1000 milliGRAM(s) Oral every 8 hours  ceFAZolin   IVPB 2000 milliGRAM(s) IV Intermittent every 8 hours  dextrose 5%. 1000 milliLiter(s) IV Continuous <Continuous>  dextrose 5%. 1000 milliLiter(s) IV Continuous <Continuous>  dextrose 50% Injectable 25 Gram(s) IV Push once  dextrose 50% Injectable 12.5 Gram(s) IV Push once  dextrose 50% Injectable 25 Gram(s) IV Push once  dextrose Oral Gel 15 Gram(s) Oral once PRN  glucagon  Injectable 1 milliGRAM(s) IntraMuscular once  HYDROmorphone  Injectable 0.5 milliGRAM(s) IV Push every 15 minutes PRN  insulin lispro (ADMELOG) corrective regimen sliding scale   SubCutaneous three times a day before meals  methocarbamol 500 milliGRAM(s) Oral every 8 hours  multivitamin 1 Tablet(s) Oral daily  ondansetron   Disintegrating Tablet 4 milliGRAM(s) Oral every 6 hours  oxyCODONE    IR 10 milliGRAM(s) Oral every 4 hours PRN  oxyCODONE    IR 5 milliGRAM(s) Oral every 4 hours PRN  pantoprazole    Tablet 40 milliGRAM(s) Oral before breakfast  polyethylene glycol 3350 17 Gram(s) Oral daily  pregabalin 50 milliGRAM(s) Oral three times a day  senna 2 Tablet(s) Oral two times a day  sodium chloride 0.9%. 1000 milliLiter(s) IV Continuous <Continuous>      RADIOLOGY: Standing X-rays prior to d/c    Exam:General - NAD, Alert and oriented x 3,   Eyes - PERRLA, EOM intact  Neck - No lymphadenopathy  Cardiovascular - RRR no m/r/g, no JVD  Lungs - Clear to auscltation, no use of accessory muscles, no crackles or wheezes.  Skin - No rashes, skin warm and dry, no erythematous areas. Incision near L4/5 does not show signs of infection.  Abdomen - Normal bowel sounds, abdomen soft and nontender  Extremeties - No edema, cyanosis or clubbing  Neurological – AAOx3. B/l UE 5/5. B/l LEs HF 3/5 pain limited KE/KF 4/5 DF/PF 5/5. Sensation equal and intact.    WOUND/DRAINS: 1 posterior drain at the incision site. Minimal output.    DEVICES:       Assessment: 70 y/o male with PMHx HTN, GERD, DM2 diet-controlled presents with lower back pain with pain radiating to bilateral posterior lower extremities (L>R) and associated LLE weakness. L5-S1 laminectomy and TLIF, now s/p PSF.    Plan:   Neuro:   - neuro/vitals q1hr  - pain control with Oxy, Dilaudid, Robaxin prn  - pending post op xrays  - x1 deep HMV, x1 superficial JESUS per plastics, monitor output   - No heavy lifting for next 6 weeks    Cards:  - SBP <160  - hx HTN: c/w home amlodipine 5 mg, valsartan 160 mg     RA:  - RA  - IS     GI:  - ADAT  - bowel regimen, pending BM  - hx GERD: c/w home omeprazole (need to confirm if pt still takes)     Renal:  - IVF while ADAT  - +rodriguez -> dc in AM  - Monitor In's and Out's. Has not voided yet.    Endo:  - hx T2DM: ISS  - f/u A1C    Heme:  - SCDs, hold SQL i/s/o fresh post op    ID:  - afebrile  - post op ancef    Dispo: regional, full code, pending PT/OT    Discussed with Dr. Pizarro        Assessment:  Present when checked    []  GCS  E   V  M     Heart Failure: []Acute, [] acute on chronic , []chronic  Heart Failure:  [] Diastolic (HFpEF), [] Systolic (HFrEF), []Combined (HFpEF and HFrEF), [] RHF, [] Pulm HTN, [] Other    [] JAYA, [] ATN, [] AIN, [] other  [] CKD1, [] CKD2, [] CKD 3, [] CKD 4, [] CKD 5, []ESRD    Encephalopathy: [] Metabolic, [] Hepatic, [] toxic, [] Neurological, [] Other    Abnormal Nurtitional Status: [] malnurtition (see nutrition note), [ ]underweight: BMI < 19, [] morbid obesity: BMI >40, [] Cachexia    [] Sepsis  [] hypovolemic shock,[] cardiogenic shock, [] hemorrhagic shock, [] neuogenic shock  [] Acute Respiratory Failure  []Cerebral edema, [] Brain compression/ herniation,   [] Functional quadriplegia  [] Acute blood loss anemia

## 2025-02-03 NOTE — H&P ADULT - ASSESSMENT
68 y/o male with PMHx HTN, GERD, DM2 diet-controlled with L5-S1 Spondylolisthesis preop for lumbar laminectomy with fusion today:    - NPO  - 3M  - Perioperative antibiotics  - SCDs  - admit to regional postop    All above d/w Dr. Pizarro

## 2025-02-03 NOTE — PRE-ANESTHESIA EVALUATION ADULT - NSANTHOSAYNRD_GEN_A_CORE
No. DAFNE screening performed.  STOP BANG Legend: 0-2 = LOW Risk; 3-4 = INTERMEDIATE Risk; 5-8 = HIGH Risk

## 2025-02-03 NOTE — H&P ADULT - NSHPLABSRESULTS_GEN_ALL_CORE
< from: MR Lumbar Spine No Cont (12.27.24 @ 16:07) >    . Grade 1 anterolisthesis of L5 upon S1 secondary to bilateral pars   defects with severe foraminal stenosis bilaterally.    < end of copied text >    < from: CT Lumbar Spine No Cont (12.27.24 @ 16:15) >    1.  Grade 1 anterolisthesis of L5 on S1 with to chronic bilateral pars   defects contributing to severe neural foramen narrowing bilaterally.    < end of copied text >

## 2025-02-04 LAB
A1C WITH ESTIMATED AVERAGE GLUCOSE RESULT: 6.2 % — HIGH (ref 4–5.6)
ANION GAP SERPL CALC-SCNC: 9 MMOL/L — SIGNIFICANT CHANGE UP (ref 5–17)
BASOPHILS # BLD AUTO: 0.01 K/UL — SIGNIFICANT CHANGE UP (ref 0–0.2)
BASOPHILS NFR BLD AUTO: 0.1 % — SIGNIFICANT CHANGE UP (ref 0–2)
BUN SERPL-MCNC: 16 MG/DL — SIGNIFICANT CHANGE UP (ref 7–23)
CALCIUM SERPL-MCNC: 8.6 MG/DL — SIGNIFICANT CHANGE UP (ref 8.4–10.5)
CHLORIDE SERPL-SCNC: 99 MMOL/L — SIGNIFICANT CHANGE UP (ref 96–108)
CO2 SERPL-SCNC: 25 MMOL/L — SIGNIFICANT CHANGE UP (ref 22–31)
CREAT SERPL-MCNC: 0.96 MG/DL — SIGNIFICANT CHANGE UP (ref 0.5–1.3)
EGFR: 86 ML/MIN/1.73M2 — SIGNIFICANT CHANGE UP
EOSINOPHIL # BLD AUTO: 0 K/UL — SIGNIFICANT CHANGE UP (ref 0–0.5)
EOSINOPHIL NFR BLD AUTO: 0 % — SIGNIFICANT CHANGE UP (ref 0–6)
ESTIMATED AVERAGE GLUCOSE: 131 MG/DL — HIGH (ref 68–114)
GLUCOSE SERPL-MCNC: 132 MG/DL — HIGH (ref 70–99)
HCT VFR BLD CALC: 34.7 % — LOW (ref 39–50)
HGB BLD-MCNC: 12 G/DL — LOW (ref 13–17)
IMM GRANULOCYTES NFR BLD AUTO: 0.8 % — SIGNIFICANT CHANGE UP (ref 0–0.9)
LYMPHOCYTES # BLD AUTO: 1.03 K/UL — SIGNIFICANT CHANGE UP (ref 1–3.3)
LYMPHOCYTES # BLD AUTO: 9.7 % — LOW (ref 13–44)
MAGNESIUM SERPL-MCNC: 1.7 MG/DL — SIGNIFICANT CHANGE UP (ref 1.6–2.6)
MCHC RBC-ENTMCNC: 31.1 PG — SIGNIFICANT CHANGE UP (ref 27–34)
MCHC RBC-ENTMCNC: 34.6 G/DL — SIGNIFICANT CHANGE UP (ref 32–36)
MCV RBC AUTO: 89.9 FL — SIGNIFICANT CHANGE UP (ref 80–100)
MONOCYTES # BLD AUTO: 1.05 K/UL — HIGH (ref 0–0.9)
MONOCYTES NFR BLD AUTO: 9.9 % — SIGNIFICANT CHANGE UP (ref 2–14)
NEUTROPHILS # BLD AUTO: 8.42 K/UL — HIGH (ref 1.8–7.4)
NEUTROPHILS NFR BLD AUTO: 79.5 % — HIGH (ref 43–77)
NRBC # BLD: 0 /100 WBCS — SIGNIFICANT CHANGE UP (ref 0–0)
NRBC BLD-RTO: 0 /100 WBCS — SIGNIFICANT CHANGE UP (ref 0–0)
PHOSPHATE SERPL-MCNC: 3.6 MG/DL — SIGNIFICANT CHANGE UP (ref 2.5–4.5)
PLATELET # BLD AUTO: 183 K/UL — SIGNIFICANT CHANGE UP (ref 150–400)
POTASSIUM SERPL-MCNC: 4.2 MMOL/L — SIGNIFICANT CHANGE UP (ref 3.5–5.3)
POTASSIUM SERPL-SCNC: 4.2 MMOL/L — SIGNIFICANT CHANGE UP (ref 3.5–5.3)
RBC # BLD: 3.86 M/UL — LOW (ref 4.2–5.8)
RBC # FLD: 13.2 % — SIGNIFICANT CHANGE UP (ref 10.3–14.5)
SODIUM SERPL-SCNC: 133 MMOL/L — LOW (ref 135–145)
WBC # BLD: 10.59 K/UL — HIGH (ref 3.8–10.5)
WBC # FLD AUTO: 10.59 K/UL — HIGH (ref 3.8–10.5)

## 2025-02-04 PROCEDURE — 99024 POSTOP FOLLOW-UP VISIT: CPT

## 2025-02-04 RX ORDER — HYDROMORPHONE HYDROCHLORIDE 4 MG/ML
0.5 INJECTION, SOLUTION INTRAMUSCULAR; INTRAVENOUS; SUBCUTANEOUS ONCE
Refills: 0 | Status: DISCONTINUED | OUTPATIENT
Start: 2025-02-04 | End: 2025-02-05

## 2025-02-04 RX ORDER — MAGNESIUM SULFATE 0.8 MEQ/ML
2 AMPUL (ML) INJECTION ONCE
Refills: 0 | Status: COMPLETED | OUTPATIENT
Start: 2025-02-04 | End: 2025-02-04

## 2025-02-04 RX ADMIN — ACETAMINOPHEN 1000 MILLIGRAM(S): 160 SUSPENSION ORAL at 22:06

## 2025-02-04 RX ADMIN — OXYCODONE HYDROCHLORIDE 10 MILLIGRAM(S): 30 TABLET ORAL at 21:06

## 2025-02-04 RX ADMIN — ONDANSETRON 4 MILLIGRAM(S): 4 TABLET, ORALLY DISINTEGRATING ORAL at 17:49

## 2025-02-04 RX ADMIN — Medication 2 TABLET(S): at 17:48

## 2025-02-04 RX ADMIN — OXYCODONE HYDROCHLORIDE 10 MILLIGRAM(S): 30 TABLET ORAL at 12:34

## 2025-02-04 RX ADMIN — Medication 500 MILLIGRAM(S): at 05:15

## 2025-02-04 RX ADMIN — ONDANSETRON 4 MILLIGRAM(S): 4 TABLET, ORALLY DISINTEGRATING ORAL at 11:31

## 2025-02-04 RX ADMIN — OXYCODONE HYDROCHLORIDE 10 MILLIGRAM(S): 30 TABLET ORAL at 17:29

## 2025-02-04 RX ADMIN — Medication 2 TABLET(S): at 05:14

## 2025-02-04 RX ADMIN — Medication 100 MILLIGRAM(S): at 03:30

## 2025-02-04 RX ADMIN — POLYETHYLENE GLYCOL 3350 17 GRAM(S): 17 POWDER, FOR SOLUTION ORAL at 11:31

## 2025-02-04 RX ADMIN — Medication 500 MILLIGRAM(S): at 13:12

## 2025-02-04 RX ADMIN — PREGABALIN CAPSULES, CV 50 MILLIGRAM(S): 225 CAPSULE ORAL at 13:13

## 2025-02-04 RX ADMIN — Medication 25 GRAM(S): at 08:08

## 2025-02-04 RX ADMIN — PREGABALIN CAPSULES, CV 50 MILLIGRAM(S): 225 CAPSULE ORAL at 22:07

## 2025-02-04 RX ADMIN — Medication 500 MILLIGRAM(S): at 22:06

## 2025-02-04 RX ADMIN — Medication 1 TABLET(S): at 11:31

## 2025-02-04 RX ADMIN — OXYCODONE HYDROCHLORIDE 10 MILLIGRAM(S): 30 TABLET ORAL at 08:40

## 2025-02-04 RX ADMIN — PANTOPRAZOLE 40 MILLIGRAM(S): 20 TABLET, DELAYED RELEASE ORAL at 05:14

## 2025-02-04 RX ADMIN — Medication 10 MILLIGRAM(S): at 05:18

## 2025-02-04 RX ADMIN — ACETAMINOPHEN 1000 MILLIGRAM(S): 160 SUSPENSION ORAL at 05:14

## 2025-02-04 RX ADMIN — ACETAMINOPHEN 1000 MILLIGRAM(S): 160 SUSPENSION ORAL at 13:12

## 2025-02-04 RX ADMIN — OXYCODONE HYDROCHLORIDE 10 MILLIGRAM(S): 30 TABLET ORAL at 22:06

## 2025-02-04 RX ADMIN — OXYCODONE HYDROCHLORIDE 10 MILLIGRAM(S): 30 TABLET ORAL at 13:34

## 2025-02-04 RX ADMIN — OXYCODONE HYDROCHLORIDE 10 MILLIGRAM(S): 30 TABLET ORAL at 05:01

## 2025-02-04 RX ADMIN — PREGABALIN CAPSULES, CV 50 MILLIGRAM(S): 225 CAPSULE ORAL at 05:15

## 2025-02-04 RX ADMIN — OXYCODONE HYDROCHLORIDE 10 MILLIGRAM(S): 30 TABLET ORAL at 04:01

## 2025-02-04 RX ADMIN — OXYCODONE HYDROCHLORIDE 10 MILLIGRAM(S): 30 TABLET ORAL at 09:40

## 2025-02-04 RX ADMIN — Medication 2: at 12:15

## 2025-02-04 RX ADMIN — OXYCODONE HYDROCHLORIDE 10 MILLIGRAM(S): 30 TABLET ORAL at 16:29

## 2025-02-04 NOTE — OCCUPATIONAL THERAPY INITIAL EVALUATION ADULT - DIAGNOSIS, OT EVAL
Pt presents with post op pain demonstrating decrease in balance and strength affecting ADLs and functional mobility.

## 2025-02-04 NOTE — OCCUPATIONAL THERAPY INITIAL EVALUATION ADULT - LIVES WITH, PROFILE
Pt lives with his family in 4 floor walk-up. Patient at baseline is ind for ADLs and uses cane for functional mobility. Patient has shower/tub with no bathroom DME.

## 2025-02-04 NOTE — OCCUPATIONAL THERAPY INITIAL EVALUATION ADULT - PERTINENT HX OF CURRENT PROBLEM, REHAB EVAL
68 y/o male with PMHx HTN, GERD, DM2 diet-controlled presents with lower back pain with pain radiating to bilateral posterior lower extremities (L>R) and associated LLE weakness. Now s/p L5-S1 laminectomy, TLIF, PSF 2/3.

## 2025-02-04 NOTE — PROGRESS NOTE ADULT - SUBJECTIVE AND OBJECTIVE BOX
HPI:    70 y/o male with PMHx DM diet controlled (HgbA1c 6.7%), HTN, GERD, presents for spine surgery.  He reports severe back pain and left greater than right leg pain.  He ambulates with a cane.  He reports numbess into both legs and reports left leg weakness when trying to walk.  He denies bowel or bladder changes, saddle anesthesia.  He has not taken any pain medication in the past month because it hasn't been helping. (03 Feb 2025 10:19)      Subjective:  Patient admits to back pain with movement.   Hospital course:  2/3: POD0 L5-S1 laminectomy, TLIF, PSF.  2/4: POD1, MARIAH overnight    Vital Signs Last 24 Hrs  T(C): 36.7 (04 Feb 2025 00:27), Max: 36.8 (03 Feb 2025 20:26)  T(F): 98.1 (04 Feb 2025 00:27), Max: 98.3 (03 Feb 2025 20:26)  HR: 61 (04 Feb 2025 00:27) (52 - 78)  BP: 123/68 (04 Feb 2025 00:27) (107/61 - 149/83)  BP(mean): 86 (03 Feb 2025 18:44) (78 - 103)  RR: 17 (04 Feb 2025 00:27) (13 - 22)  SpO2: 95% (04 Feb 2025 00:27) (93% - 100%)    Parameters below as of 04 Feb 2025 00:27  Patient On (Oxygen Delivery Method): room air        I&O's Summary    03 Feb 2025 07:01  -  04 Feb 2025 02:58  --------------------------------------------------------  IN: 630 mL / OUT: 515 mL / NET: 115 mL        PHYSICAL EXAM:  General: patient seen laying supine in bed in NAD, Stateless speaking  Neuro: AAOx3, follows commands, opens eyes spontaneously, speech clear and fluent,  face symmetric,  strength 5/5 b/l upper extremities and lower extremities, sensation intact to light touch throughout  HEENT: PERRL  Neck: supple  Cardiac: RRR, S1S2  Pulmonary: chest rise symmetric  Abdomen: soft, nontender, nondistended  Ext: perfusing well  Skin: warm, dry  Wound: posterior lumbar incision dressing c/d/i, HMV x1, JESUS x1    LABS:                        13.6   7.63  )-----------( 158      ( 03 Feb 2025 16:00 )             38.6     02-03    136  |  103  |  17  ----------------------------<  145[H]  see note   |  23  |  0.85    Ca    8.6      03 Feb 2025 16:00        Urinalysis Basic - ( 03 Feb 2025 16:00 )    Color: x / Appearance: x / SG: x / pH: x  Gluc: 145 mg/dL / Ketone: x  / Bili: x / Urobili: x   Blood: x / Protein: x / Nitrite: x   Leuk Esterase: x / RBC: x / WBC x   Sq Epi: x / Non Sq Epi: x / Bacteria: x          CAPILLARY BLOOD GLUCOSE      POCT Blood Glucose.: 248 mg/dL (03 Feb 2025 22:28)  POCT Blood Glucose.: 134 mg/dL (03 Feb 2025 15:56)  POCT Blood Glucose.: 110 mg/dL (03 Feb 2025 10:46)      Drug Levels: [] N/A    CSF Analysis: [] N/A      Allergies    No Known Allergies    Intolerances      MEDICATIONS:  Antibiotics:  ceFAZolin   IVPB 2000 milliGRAM(s) IV Intermittent every 8 hours    Neuro:  acetaminophen     Tablet .. 1000 milliGRAM(s) Oral every 8 hours  HYDROmorphone  Injectable 0.5 milliGRAM(s) IV Push every 15 minutes PRN  methocarbamol 500 milliGRAM(s) Oral every 8 hours  ondansetron   Disintegrating Tablet 4 milliGRAM(s) Oral every 6 hours  oxyCODONE    IR 10 milliGRAM(s) Oral every 4 hours PRN  oxyCODONE    IR 5 milliGRAM(s) Oral every 4 hours PRN  pregabalin 50 milliGRAM(s) Oral three times a day    Anticoagulation:    OTHER:  amLODIPine   Tablet 10 milliGRAM(s) Oral daily  insulin lispro (ADMELOG) corrective regimen sliding scale   SubCutaneous Before meals and at bedtime  pantoprazole    Tablet 40 milliGRAM(s) Oral before breakfast  polyethylene glycol 3350 17 Gram(s) Oral daily  senna 2 Tablet(s) Oral two times a day    IVF:  multivitamin 1 Tablet(s) Oral daily    CULTURES:    RADIOLOGY & ADDITIONAL TESTS:    M43.16    No pertinent family history in first degree relatives    Handoff    MEWS Score    HTN (hypertension)    DM (diabetes mellitus)    GERD (gastroesophageal reflux disease)    Spondylolisthesis    History of cholecystectomy    H/O eye surgery    SysAdmin_VstLnk        ASSESSMENT:  70 y/o male with PMHx HTN, GERD, DM2 diet-controlled presents with lower back pain with pain radiating to bilateral posterior lower extremities (L>R) and associated LLE weakness. Now s/p L5-S1 laminectomy, TLIF, PSF 2/3.     Plan:   Neuro:   - neuro/vitals q8hr  - pain control with spinal eras   - pending post op xrays  - x1 deep HMV, x1 superficial JESUS per plastics, monitor output     Cards:  - SBP <160  - hx HTN: c/w home amlodipine 5 mg, valsartan 160 mg     RA:  - RA  - IS     GI:  - consistent carb diet  - bowel regimen, pending BM  - hx GERD: c/w home omeprazole (need to confirm if pt still takes)     Renal:  - IVL  - +rodriguez -> dc in AM    Endo:  - hx T2DM: ISS  - f/u A1C    Heme:  - SCDs, hold SQL i/s/o fresh post op    ID:  - afebrile  - post op ancef    Dispo: regional, full code, pending PT/OT    Discussed with Dr. Pizarro      Assessment:  Present when checked    []  GCS  E   V  M     Heart Failure: []Acute, [] acute on chronic , []chronic  Heart Failure:  [] Diastolic (HFpEF), [] Systolic (HFrEF), []Combined (HFpEF and HFrEF), [] RHF, [] Pulm HTN, [] Other    [] JAYA, [] ATN, [] AIN, [] other  [] CKD1, [] CKD2, [] CKD 3, [] CKD 4, [] CKD 5, []ESRD    Encephalopathy: [] Metabolic, [] Hepatic, [] toxic, [] Neurological, [] Other    Abnormal Nurtitional Status: [] malnurtition (see nutrition note), [ ]underweight: BMI < 19, [] morbid obesity: BMI >40, [] Cachexia    [] Sepsis  [] hypovolemic shock,[] cardiogenic shock, [] hemorrhagic shock, [] neuogenic shock  [] Acute Respiratory Failure  []Cerebral edema, [] Brain compression/ herniation,   [] Functional quadriplegia  [] Acute blood loss anemia

## 2025-02-04 NOTE — OCCUPATIONAL THERAPY INITIAL EVALUATION ADULT - THERAPY FREQUENCY, OT EVAL
10/03/17        Norma Pappas  [address] [place] [address] [id number]      Dear Scottie Hernandez,    [facility] records indicate that you have outstanding lab work and or testing that was ordered for you and has not yet been completed:          Hemoglobin A1C [E]
3-5x/week

## 2025-02-05 ENCOUNTER — TRANSCRIPTION ENCOUNTER (OUTPATIENT)
Age: 70
End: 2025-02-05

## 2025-02-05 LAB
ANION GAP SERPL CALC-SCNC: 9 MMOL/L — SIGNIFICANT CHANGE UP (ref 5–17)
BUN SERPL-MCNC: 18 MG/DL — SIGNIFICANT CHANGE UP (ref 7–23)
CALCIUM SERPL-MCNC: 8.4 MG/DL — SIGNIFICANT CHANGE UP (ref 8.4–10.5)
CHLORIDE SERPL-SCNC: 102 MMOL/L — SIGNIFICANT CHANGE UP (ref 96–108)
CO2 SERPL-SCNC: 27 MMOL/L — SIGNIFICANT CHANGE UP (ref 22–31)
CREAT SERPL-MCNC: 1.08 MG/DL — SIGNIFICANT CHANGE UP (ref 0.5–1.3)
EGFR: 75 ML/MIN/1.73M2 — SIGNIFICANT CHANGE UP
GLUCOSE SERPL-MCNC: 114 MG/DL — HIGH (ref 70–99)
HCT VFR BLD CALC: 34.8 % — LOW (ref 39–50)
HGB BLD-MCNC: 11.8 G/DL — LOW (ref 13–17)
MAGNESIUM SERPL-MCNC: 2 MG/DL — SIGNIFICANT CHANGE UP (ref 1.6–2.6)
MCHC RBC-ENTMCNC: 31.1 PG — SIGNIFICANT CHANGE UP (ref 27–34)
MCHC RBC-ENTMCNC: 33.9 G/DL — SIGNIFICANT CHANGE UP (ref 32–36)
MCV RBC AUTO: 91.6 FL — SIGNIFICANT CHANGE UP (ref 80–100)
NRBC # BLD: 0 /100 WBCS — SIGNIFICANT CHANGE UP (ref 0–0)
NRBC BLD-RTO: 0 /100 WBCS — SIGNIFICANT CHANGE UP (ref 0–0)
PHOSPHATE SERPL-MCNC: 3 MG/DL — SIGNIFICANT CHANGE UP (ref 2.5–4.5)
PLATELET # BLD AUTO: 179 K/UL — SIGNIFICANT CHANGE UP (ref 150–400)
POTASSIUM SERPL-MCNC: 4 MMOL/L — SIGNIFICANT CHANGE UP (ref 3.5–5.3)
POTASSIUM SERPL-SCNC: 4 MMOL/L — SIGNIFICANT CHANGE UP (ref 3.5–5.3)
RBC # BLD: 3.8 M/UL — LOW (ref 4.2–5.8)
RBC # FLD: 13.5 % — SIGNIFICANT CHANGE UP (ref 10.3–14.5)
SODIUM SERPL-SCNC: 138 MMOL/L — SIGNIFICANT CHANGE UP (ref 135–145)
WBC # BLD: 9.09 K/UL — SIGNIFICANT CHANGE UP (ref 3.8–10.5)
WBC # FLD AUTO: 9.09 K/UL — SIGNIFICANT CHANGE UP (ref 3.8–10.5)

## 2025-02-05 PROCEDURE — 72100 X-RAY EXAM L-S SPINE 2/3 VWS: CPT | Mod: 26

## 2025-02-05 PROCEDURE — 99223 1ST HOSP IP/OBS HIGH 75: CPT

## 2025-02-05 PROCEDURE — 99024 POSTOP FOLLOW-UP VISIT: CPT

## 2025-02-05 RX ORDER — METHOCARBAMOL 500 MG
750 TABLET ORAL EVERY 8 HOURS
Refills: 0 | Status: DISCONTINUED | OUTPATIENT
Start: 2025-02-05 | End: 2025-02-07

## 2025-02-05 RX ORDER — POLYETHYLENE GLYCOL 3350 17 G/17G
17 POWDER, FOR SOLUTION ORAL
Refills: 0 | Status: DISCONTINUED | OUTPATIENT
Start: 2025-02-05 | End: 2025-02-07

## 2025-02-05 RX ORDER — KETOROLAC TROMETHAMINE 10 MG
15 TABLET ORAL EVERY 12 HOURS
Refills: 0 | Status: COMPLETED | OUTPATIENT
Start: 2025-02-05 | End: 2025-02-07

## 2025-02-05 RX ORDER — KETOROLAC TROMETHAMINE 10 MG
15 TABLET ORAL ONCE
Refills: 0 | Status: DISCONTINUED | OUTPATIENT
Start: 2025-02-05 | End: 2025-02-05

## 2025-02-05 RX ORDER — PREGABALIN CAPSULES, CV 225 MG/1
75 CAPSULE ORAL EVERY 8 HOURS
Refills: 0 | Status: DISCONTINUED | OUTPATIENT
Start: 2025-02-05 | End: 2025-02-07

## 2025-02-05 RX ORDER — KETOROLAC TROMETHAMINE 10 MG
15 TABLET ORAL EVERY 12 HOURS
Refills: 0 | Status: DISCONTINUED | OUTPATIENT
Start: 2025-02-05 | End: 2025-02-05

## 2025-02-05 RX ORDER — KETOROLAC TROMETHAMINE 10 MG
15 TABLET ORAL EVERY 8 HOURS
Refills: 0 | Status: DISCONTINUED | OUTPATIENT
Start: 2025-02-05 | End: 2025-02-05

## 2025-02-05 RX ORDER — PANTOPRAZOLE 20 MG/1
40 TABLET, DELAYED RELEASE ORAL
Refills: 0 | Status: DISCONTINUED | OUTPATIENT
Start: 2025-02-05 | End: 2025-02-07

## 2025-02-05 RX ORDER — ASPIRIN 81 MG/1
81 TABLET, COATED ORAL DAILY
Refills: 0 | Status: DISCONTINUED | OUTPATIENT
Start: 2025-02-05 | End: 2025-02-07

## 2025-02-05 RX ORDER — ENOXAPARIN SODIUM 100 MG/ML
40 INJECTION SUBCUTANEOUS
Refills: 0 | Status: DISCONTINUED | OUTPATIENT
Start: 2025-02-05 | End: 2025-02-07

## 2025-02-05 RX ADMIN — ONDANSETRON 4 MILLIGRAM(S): 4 TABLET, ORALLY DISINTEGRATING ORAL at 05:58

## 2025-02-05 RX ADMIN — PREGABALIN CAPSULES, CV 75 MILLIGRAM(S): 225 CAPSULE ORAL at 14:15

## 2025-02-05 RX ADMIN — OXYCODONE HYDROCHLORIDE 10 MILLIGRAM(S): 30 TABLET ORAL at 17:58

## 2025-02-05 RX ADMIN — ENOXAPARIN SODIUM 40 MILLIGRAM(S): 100 INJECTION SUBCUTANEOUS at 22:07

## 2025-02-05 RX ADMIN — PREGABALIN CAPSULES, CV 50 MILLIGRAM(S): 225 CAPSULE ORAL at 05:57

## 2025-02-05 RX ADMIN — OXYCODONE HYDROCHLORIDE 10 MILLIGRAM(S): 30 TABLET ORAL at 12:10

## 2025-02-05 RX ADMIN — OXYCODONE HYDROCHLORIDE 10 MILLIGRAM(S): 30 TABLET ORAL at 18:54

## 2025-02-05 RX ADMIN — PANTOPRAZOLE 40 MILLIGRAM(S): 20 TABLET, DELAYED RELEASE ORAL at 05:57

## 2025-02-05 RX ADMIN — Medication 2 TABLET(S): at 05:58

## 2025-02-05 RX ADMIN — Medication 750 MILLIGRAM(S): at 14:15

## 2025-02-05 RX ADMIN — HYDROMORPHONE HYDROCHLORIDE 0.5 MILLIGRAM(S): 4 INJECTION, SOLUTION INTRAMUSCULAR; INTRAVENOUS; SUBCUTANEOUS at 08:52

## 2025-02-05 RX ADMIN — ACETAMINOPHEN 1000 MILLIGRAM(S): 160 SUSPENSION ORAL at 22:08

## 2025-02-05 RX ADMIN — ACETAMINOPHEN 1000 MILLIGRAM(S): 160 SUSPENSION ORAL at 05:57

## 2025-02-05 RX ADMIN — Medication 15 MILLIGRAM(S): at 20:10

## 2025-02-05 RX ADMIN — ASPIRIN 81 MILLIGRAM(S): 81 TABLET, COATED ORAL at 14:15

## 2025-02-05 RX ADMIN — Medication 750 MILLIGRAM(S): at 22:08

## 2025-02-05 RX ADMIN — PREGABALIN CAPSULES, CV 75 MILLIGRAM(S): 225 CAPSULE ORAL at 22:08

## 2025-02-05 RX ADMIN — Medication 500 MILLIGRAM(S): at 05:58

## 2025-02-05 RX ADMIN — Medication 4: at 11:55

## 2025-02-05 RX ADMIN — Medication 2 TABLET(S): at 17:58

## 2025-02-05 RX ADMIN — POLYETHYLENE GLYCOL 3350 17 GRAM(S): 17 POWDER, FOR SOLUTION ORAL at 05:57

## 2025-02-05 RX ADMIN — Medication 10 MILLIGRAM(S): at 05:58

## 2025-02-05 RX ADMIN — POLYETHYLENE GLYCOL 3350 17 GRAM(S): 17 POWDER, FOR SOLUTION ORAL at 17:59

## 2025-02-05 RX ADMIN — PANTOPRAZOLE 40 MILLIGRAM(S): 20 TABLET, DELAYED RELEASE ORAL at 17:58

## 2025-02-05 RX ADMIN — ACETAMINOPHEN 1000 MILLIGRAM(S): 160 SUSPENSION ORAL at 14:14

## 2025-02-05 RX ADMIN — Medication 1 TABLET(S): at 12:10

## 2025-02-05 NOTE — DISCHARGE NOTE PROVIDER - NSDCMRMEDTOKEN_GEN_ALL_CORE_FT
amLODIPine 10 mg oral tablet: 1 tab(s) orally once a day  aspirin 81 mg oral delayed release tablet: 1 tab(s) orally once a day  Commode: use as needed  omeprazole 40 mg oral delayed release capsule: 1 cap(s) orally once a day  Rolling Walker: use as needed  Transfer Shower Bench: use as needed  valsartan 160 mg oral capsule: orally once a day   acetaminophen 500 mg oral tablet: 2 tab(s) orally every 8 hours as needed for mild to moderate pain  amLODIPine 10 mg oral tablet: 1 tab(s) orally once a day  aspirin 81 mg oral delayed release tablet: 1 tab(s) orally once a day  omeprazole 40 mg oral delayed release capsule: 1 cap(s) orally once a day  valsartan 160 mg oral capsule: orally once a day   acetaminophen 500 mg oral tablet: 2 tab(s) orally every 8 hours as needed for mild to moderate pain  amLODIPine 10 mg oral tablet: 1 tab(s) orally once a day  aspirin 81 mg oral delayed release tablet: 1 tab(s) orally once a day  hydrocortisone 1% topical cream: 1 Apply topically to affected area 2 times a day As needed Itching  methocarbamol 750 mg oral tablet: 1 tab(s) orally every 8 hours as needed for  muscle spasm MDD: 3 tabs  Narcan 4 mg/0.1 mL nasal spray: 1 spray(s) intranasally once a day as needed for opioid overdose  omeprazole 40 mg oral delayed release capsule: 1 cap(s) orally once a day  oxyCODONE 5 mg oral tablet: 1 tab(s) orally every 6 hours as needed for  severe pain MDD: 4 tabs  polyethylene glycol 3350 oral powder for reconstitution: 17 gram(s) orally 2 times a day as needed for  constipation  pregabalin 75 mg oral capsule: 1 cap(s) orally every 8 hours wean off as tolerated MDD: 3 tabs  tamsulosin 0.4 mg oral capsule: 1 cap(s) orally once a day (at bedtime)  valsartan 160 mg oral capsule: orally once a day   acetaminophen 500 mg oral tablet: 2 tab(s) orally every 8 hours as needed for mild to moderate pain  amLODIPine 10 mg oral tablet: 1 tab(s) orally once a day  aspirin 81 mg oral delayed release tablet: 1 tab(s) orally once a day  cyclobenzaprine 10 mg oral tablet: 1 tab(s) orally every 8 hours as needed for  muscle spasm MDD: 3 tabs  hydrocortisone 1% topical cream: 1 Apply topically to affected area 2 times a day As needed Itching  Narcan 4 mg/0.1 mL nasal spray: 1 spray(s) intranasally once a day as needed for opioid overdose  omeprazole 40 mg oral delayed release capsule: 1 cap(s) orally once a day  oxyCODONE 5 mg oral tablet: 1 tab(s) orally every 6 hours as needed for  severe pain MDD: 4 tabs  polyethylene glycol 3350 oral powder for reconstitution: 17 gram(s) orally 2 times a day as needed for  constipation  pregabalin 75 mg oral capsule: 1 cap(s) orally every 8 hours wean off as tolerated MDD: 3 tabs  tamsulosin 0.4 mg oral capsule: 1 cap(s) orally once a day (at bedtime)  valsartan 160 mg oral capsule: orally once a day

## 2025-02-05 NOTE — DISCHARGE NOTE PROVIDER - CARE PROVIDERS DIRECT ADDRESSES
[email or web address]

## 2025-02-05 NOTE — DISCHARGE NOTE PROVIDER - HOSPITAL COURSE
HPI:    Hospital Course:    Patient evaluated by PT/OT who recommended:  Patient is going home? rehab? hospice? Facility Name:     Hospital course c/b:     Exam on day of discharge:    Checklist:   - Obtained follow up appointment from NP  - Reviewed final recommendations of inpatient consults  - review discharge planning on provider handoff  - post op imaging completed  - Neurologically stable for discharge  - Vitals stable for discharge   - Afebrile for discharge  - WBC is stable  - Sodium level is normal  - Pain is adequately controlled  - Pt has PICC/walker/brace/collar   - LACE score (10 or > needs PCP apt)   - Needs PCP Follow up?   - stroke patient? Discharge NIHSS score     Given the ongoing treatment plan, please note that the patient has a high potential for further admissions to deliver ongoing treatment and/or procedures as part of the normal course of neurosurgical care    HPI:  Taken from outpatient neurosurgery note " 70 y/o male with hx of HTN coming in for lower back pain. Pain radiating to bilateral posterior lower extremities (L>R). Reporting weakness of the left lower extremity.     70 y/o male with PMHx DM diet controlled (HgbA1c 6.7%), HTN, GERD, presents for spine surgery.  He reports severe back pain and left greater than right leg pain.  He ambulates with a cane.  He reports numbess into both legs and reports left leg weakness when trying to walk.  He denies bowel or bladder changes, saddle anesthesia.  He has not taken any pain medication in the past month because it hasn't been helping    Hospital Course:  2/3: POD0 L5-S1 laminectomy, TLIF, PSF.  2/4: POD1, MARIAH overnight. PT/OT rec home PT/OT. Recieved IV dilaudid for pain in evening.  2/5: POD2, MARIAH overnight. bowel regimen increased. SQL prophylaxis started.  2/6: POD 3, HMV removed. Started Flomax.   2/7: POD 4, MARIAH    Patient evaluated by PT/OT who recommended: home with home PT/OT  Patient is going home     Hospital course uncomplicated     Exam on day of discharge:  General: patient seen laying supine in bed in NAD, Yoruba speaking  Neuro: AAOx3, follows commands, opens eyes spontaneously, speech clear and fluent,  face symmetric,  strength 5/5 b/l upper extremities and lower extremities, sensation intact to light touch throughout  HEENT: PERRL  Neck: supple  Cardiac: RRR, S1S2  Pulmonary: chest rise symmetric  Abdomen: soft, nontender, nondistended  Ext: perfusing well  Skin: warm, dry  Wound: posterior lumbar incision dressing c/d/i      Patient is neuro stable, vitals stable, afebrile, medically ready for discharge     Checklist:   - Obtained follow up appointment from NP  - Vitals stable for discharge   - Afebrile for discharge  - WBC is stable  - Sodium level is normal      Given the ongoing treatment plan, please note that the patient has a high potential for further admissions to deliver ongoing treatment and/or procedures as part of the normal course of neurosurgical care    HPI:  Taken from outpatient neurosurgery note " 70 y/o male with hx of HTN coming in for lower back pain. Pain radiating to bilateral posterior lower extremities (L>R). Reporting weakness of the left lower extremity.     70 y/o male with PMHx DM diet controlled (HgbA1c 6.7%), HTN, GERD, presents for spine surgery.  He reports severe back pain and left greater than right leg pain.  He ambulates with a cane.  He reports numbess into both legs and reports left leg weakness when trying to walk.  He denies bowel or bladder changes, saddle anesthesia.  He has not taken any pain medication in the past month because it hasn't been helping    Hospital Course:  2/3: POD0 L5-S1 laminectomy, TLIF, PSF.  2/4: POD1, MARIAH overnight. PT/OT rec home PT/OT. Recieved IV dilaudid for pain in evening.  2/5: POD2, MARIAH overnight. bowel regimen increased. SQL prophylaxis started.  2/6: POD 3, HMV removed. Started Flomax.   2/7: POD 4, MARIAH    Patient evaluated by PT/OT who recommended: home with home PT/OT  Patient is going home     Hospital course uncomplicated     Exam on day of discharge:  General: patient seen laying supine in bed in NAD, Sinhala speaking  Neuro: AAOx3, follows commands, opens eyes spontaneously, speech clear and fluent,  face symmetric,  strength 5/5 b/l upper extremities and lower extremities, sensation intact to light touch throughout  HEENT: PERRL  Neck: supple  Cardiac: RRR, S1S2  Pulmonary: chest rise symmetric  Abdomen: soft, nontender, nondistended  Ext: perfusing well  Skin: warm, dry  Wound: posterior lumbar incision dressing c/d/i      Patient is neuro stable, vitals stable, afebrile, medically ready for discharge     Patient is neuro stable, vitals stable, afebrile, medically ready for discharge      Given the ongoing treatment plan, please note that the patient has a high potential for further admissions to deliver ongoing treatment and/or procedures as part of the normal course of neurosurgical care    HPI:  Taken from outpatient neurosurgery note " 68 y/o male with hx of HTN coming in for lower back pain. Pain radiating to bilateral posterior lower extremities (L>R). Reporting weakness of the left lower extremity.     68 y/o male with PMHx DM diet controlled (HgbA1c 6.7%), HTN, GERD, presents for spine surgery.  He reports severe back pain and left greater than right leg pain.  He ambulates with a cane.  He reports numbess into both legs and reports left leg weakness when trying to walk.  He denies bowel or bladder changes, saddle anesthesia.  He has not taken any pain medication in the past month because it hasn't been helping    Hospital Course:  2/3: POD0 L5-S1 laminectomy, TLIF, PSF.  2/4: POD1, MARIAH overnight. PT/OT rec home PT/OT. Recieved IV dilaudid for pain in evening.  2/5: POD2, MARIAH overnight. bowel regimen increased. SQL prophylaxis started.  2/6: POD 3, HMV removed. Started Flomax.   2/7: POD 4, MARIAH    Patient evaluated by PT/OT who recommended: home with home PT/OT  Patient is going home     Hospital course uncomplicated     Exam on day of discharge:  General: patient seen laying supine in bed in NAD, Chinese speaking  Neuro: AAOx3, follows commands, opens eyes spontaneously, speech clear and fluent,  face symmetric,  strength 5/5 b/l upper extremities and lower extremities, sensation intact to light touch throughout  HEENT: PERRL  Neck: supple  Cardiac: RRR, S1S2  Pulmonary: chest rise symmetric  Abdomen: soft, nontender, nondistended  Ext: perfusing well  Skin: warm, dry  Wound: posterior lumbar incision dressing c/d/i      Patient is neuro stable, vitals stable, afebrile, medically ready for discharge       Given the ongoing treatment plan, please note that the patient has a high potential for further admissions to deliver ongoing treatment and/or procedures as part of the normal course of neurosurgical care

## 2025-02-05 NOTE — DISCHARGE NOTE PROVIDER - NSDCCPCAREPLAN_GEN_ALL_CORE_FT
"ED Provider Note    CHIEF COMPLAINT  Chief Complaint   Patient presents with   • Wheezing     Sick for the last few days, Recent ear infection with ABX. increased WOB tonight.       History provided by guardian  KP  Ez Siegel is a 3 y.o. male brought in by EMS for cough and shortness of breath.  The history is provided by the patient's  who has had the patient for the past few weeks.  She states that she believes he has had a history of some breathing problems in the past and had aspiration pneumonia when he was a .  He otherwise does not have any chronic medical conditions.  She took him into the pediatrician earlier in the week for a cough.  He was diagnosed with an ear infection and placed on antibiotics.  She is not sure what the antibiotics were.  The patient had vomiting beginning 4 days ago, the vomiting has been intermittent.  Antibiotics were started 3 days ago.  The cough began 2 days ago and worsened overnight.      REVIEW OF SYSTEMS  See HPI,  Remainder of ROS negative/limited due to age.   PAST MEDICAL HISTORY   has a past medical history of Premature baby, Prematurity, and Wheezing.    SOCIAL HISTORY    No second hand smoke exposure.     SURGICAL HISTORY  patient denies any surgical history    CURRENT MEDICATIONS  Reviewed.  See Encounter Summary.     ALLERGIES  No Known Allergies    PHYSICAL EXAM  VITAL SIGNS: BP (!) 130/70   Pulse (!) 152   Temp 37.8 °C (100 °F) (Temporal)   Resp 28   Ht 0.838 m (2' 9\")   Wt 12.7 kg (28 lb)   SpO2 94%   BMI 18.08 kg/m²   Constitutional: Alert in no mild respiratory distress but smiles and is cooperative.  Sitting up playing with a tablet.  Occasional wet sounding cough.  Not stridorous.  HENT: Normocephalic, Atraumatic, Bilateral external ears normal, Nose normal. Moist mucous membranes.  Eyes: Pupils are equal and reactive, Conjunctiva normal, Non-icteric.   Ears: Normal external ears.  Neck: Normal range of motion, No tenderness, " Supple, No stridor. No evidence of meningeal irritation.  Lymphatic: No lymphadenopathy noted.   Cardiovascular: Borderline tachycardic, no murmurs.   Thorax & Lungs: Tachypneic, not stridorous.  Intercostal retractions, decreased air movement, no obvious wheezes or rhonchi.  Normal speech.  Abdomen: Bowel sounds normal, Soft, No tenderness, No masses.  Skin: Warm, Dry, No erythema, No rash, No Petechiae.   Musculoskeletal: Good range of motion in all major joints. No tenderness to palpation or major deformities noted.   Neurologic: Alert, Normal motor function, Normal sensory function, No focal deficits noted.   Psychiatric: Non-toxic in appearance and behavior.       Nursing notes and vital signs were reviewed. (See chart for details) medical record reviewed, COVID-19 negative.    6:24 AM-after evaluation, plan to initiate dexamethasone and albuterol treatments.  This appears to be a straightforward viral respiratory illness with RAD.  If the child does not dramatically improve with conservative management will escalate to include chest x-ray and consider laboratory evaluation.    8:00 AM-child has a very mild tracheal tug, wheezing, retractions and tachypnea all resolved.  Oxygenation 94% on room air.  He is able to talk normal.     8:31 AM- retracting again, will redose albuterol      Decision Making:  This is a 3 y.o. year old male who presents with respiratory distress.  The child did very well with albuterol treatments and dexamethasone.  Oxygenation has improved, tachypnea has resolved.  The patient now is stable for discharge.  Presentation today was not consistent with croup.  Given the resolution with nebulizer treatments, do not suspect foreign body.  No indication for chest x-ray.  The patient is stable for discharge.  I discussed return precautions with the .    Discharge Medications:  New Prescriptions    ALBUTEROL 108 (90 BASE) MCG/ACT AERO SOLN INHALATION AEROSOL    Inhale 2 Puffs every  6 hours as needed for Shortness of Breath.       The patient was discharged home (see d/c instructions) and parent was told to return immediately for any signs or symptoms listed, or any worsening at all.  The patient's parent verbally agreed to the discharge precautions and follow-up plan which is documented in EPIC.    FINAL IMPRESSION  1. Wheezing    2. Viral syndrome    3. Mild intermittent reactive airway disease with acute exacerbation                PRINCIPAL DISCHARGE DIAGNOSIS  Diagnosis: Spondylolisthesis at L5-S1 level  Assessment and Plan of Treatment: L5-S1 spondylolysis and spondylolisthesis with low back pain and radiculopathy.  you underwent an L5-S1 laminectomy and transformaninal lumbar interbody fusion and posterior fusion on 2/3  Follow up with Dr. Pizarro and Dr. Clayton     PRINCIPAL DISCHARGE DIAGNOSIS  Diagnosis: Spondylolisthesis at L5-S1 level  Assessment and Plan of Treatment: L5-S1 spondylolysis and spondylolisthesis with low back pain and radiculopathy.  you underwent an L5-S1 laminectomy and transformaninal lumbar interbody fusion and posterior fusion on 2/3  Follow up with Dr. Pizarro and Dr. Clayton      SECONDARY DISCHARGE DIAGNOSES  Diagnosis: HTN (hypertension)  Assessment and Plan of Treatment:     Diagnosis: GERD (gastroesophageal reflux disease)  Assessment and Plan of Treatment:     Diagnosis: DM (diabetes mellitus)  Assessment and Plan of Treatment:      PRINCIPAL DISCHARGE DIAGNOSIS  Diagnosis: Spondylolisthesis at L5-S1 level  Assessment and Plan of Treatment: L5-S1 spondylolysis and spondylolisthesis with low back pain and radiculopathy.  you underwent an L5-S1 laminectomy and transformaninal lumbar interbody fusion and posterior fusion on 2/3  Follow up with Dr. Pizarro and Dr. Clayton      SECONDARY DISCHARGE DIAGNOSES  Diagnosis: HTN (hypertension)  Assessment and Plan of Treatment: continue home Amlodipine    Diagnosis: GERD (gastroesophageal reflux disease)  Assessment and Plan of Treatment: continue home Omeprazole    Diagnosis: DM (diabetes mellitus)  Assessment and Plan of Treatment:     Diagnosis: BPH associated with nocturia  Assessment and Plan of Treatment: started on Flomax  Follow up with your PCP outpatient

## 2025-02-05 NOTE — DISCHARGE NOTE PROVIDER - CARE PROVIDER_API CALL
Eduardo Pizarro.  Neurosurgery  [address], [address] [place] [address]  Phone: [phone]  Fax: [phone]  Follow Up Time:     Aracely Clayton  Plastic Surgery  [address] [facility], [address]  [place] [address]  Phone: [phone]  Fax: [phone]  Follow Up Time:     Charan Ramirez [facility]  Pain Medicine  [address] [place] [address], [address]  [place] [address]  Phone: [phone]  Fax: [phone]  Follow Up Time:    Eduardo Pizarro  Neurosurgery  [address], [address] [place] [address]  Phone: [phone]  Fax: [phone]  Scheduled Appointment: 02/21/2025 12:00 PM    Aracely Clayton  Plastic Surgery  [address] [facility], [address]  [place] [address]  Phone: [phone]  Fax: [phone]  Follow Up Time:     [facility]  [facility] Medicine  [address] [place] [address], [address]  [place] [address]  Phone: [phone]  Fax: [phone]  Follow Up Time:

## 2025-02-05 NOTE — DISCHARGE NOTE PROVIDER - NSDCFUADDAPPT_GEN_ALL_CORE_FT
Please follow up with Dr. Pizarro    Please follow up with Dr. Clayton from Plastic surgery    Please follow up with Dr. Ramirez from pain management    Please follow up with your primary care doctor  Please follow up with Dr. Pizarro, call the office to make/confirm appointment at [phone]    Please follow up with Dr. Clayton from Plastic surgery    Please follow up with Dr. Ramirez from pain management    Please follow up with your primary care doctor  Please follow up with SAMEER Cabrera (NP with Dr. Pizarro) on 2/21 at 12PM, call the office to make/confirm appointment at [phone]    Please follow up with Dr. Clayton from Plastic surgery    Please follow up with Dr. Ramirez from pain management    Please follow up with your primary care doctor

## 2025-02-05 NOTE — DISCHARGE NOTE PROVIDER - NSDCFUSCHEDAPPT_GEN_ALL_CORE_FT
Deborah Hurd  [facility]  [facility] [address]  Scheduled Appointment: 02/21/2025

## 2025-02-05 NOTE — PROGRESS NOTE ADULT - SUBJECTIVE AND OBJECTIVE BOX
HPI:    68 y/o male with PMHx DM diet controlled (HgbA1c 6.7%), HTN, GERD, presents for spine surgery.  He reports severe back pain and left greater than right leg pain.  He ambulates with a cane.  He reports numbess into both legs and reports left leg weakness when trying to walk.  He denies bowel or bladder changes, saddle anesthesia.  He has not taken any pain medication in the past month because it hasn't been helping. (03 Feb 2025 10:19)      Subjective:  No acute complaints.     Hospital course:  2/3: POD0 L5-S1 laminectomy, TLIF, PSF.  2/4: POD1, MARIAH overnight. PT/OT rec home PT/OT. Recieved IV dilaudid for pain in evening.  2/5: POD2, MARIAH overnight. bowel regimen increased.      Vital Signs Last 24 Hrs  T(C): 36.7 (04 Feb 2025 20:20), Max: 37.1 (04 Feb 2025 08:40)  T(F): 98.1 (04 Feb 2025 20:20), Max: 98.8 (04 Feb 2025 08:40)  HR: 68 (04 Feb 2025 20:20) (61 - 72)  BP: 131/71 (04 Feb 2025 20:20) (131/71 - 153/76)  BP(mean): 102 (04 Feb 2025 15:30) (101 - 102)  RR: 16 (04 Feb 2025 20:20) (16 - 17)  SpO2: 95% (04 Feb 2025 20:20) (93% - 96%)    Parameters below as of 04 Feb 2025 20:20  Patient On (Oxygen Delivery Method): room air        I&O's Summary    03 Feb 2025 07:01  -  04 Feb 2025 07:00  --------------------------------------------------------  IN: 950 mL / OUT: 1600 mL / NET: -650 mL    04 Feb 2025 07:01  -  05 Feb 2025 00:51  --------------------------------------------------------  IN: 0 mL / OUT: 1835 mL / NET: -1835 mL        PHYSICAL EXAM:  General: patient seen laying supine in bed in NAD, Belgian speaking  Neuro: AAOx3, follows commands, opens eyes spontaneously, speech clear and fluent,  face symmetric,  strength 5/5 b/l upper extremities and lower extremities, sensation intact to light touch throughout  HEENT: PERRL  Neck: supple  Cardiac: RRR, S1S2  Pulmonary: chest rise symmetric  Abdomen: soft, nontender, nondistended  Ext: perfusing well  Skin: warm, dry  Wound: posterior lumbar incision dressing c/d/i, HMV x1, JESUS x1      LABS:                        12.0   10.59 )-----------( 183      ( 04 Feb 2025 06:58 )             34.7     02-04    133[L]  |  99  |  16  ----------------------------<  132[H]  4.2   |  25  |  0.96    Ca    8.6      04 Feb 2025 06:58  Phos  3.6     02-04  Mg     1.7     02-04        Urinalysis Basic - ( 04 Feb 2025 06:58 )    Color: x / Appearance: x / SG: x / pH: x  Gluc: 132 mg/dL / Ketone: x  / Bili: x / Urobili: x   Blood: x / Protein: x / Nitrite: x   Leuk Esterase: x / RBC: x / WBC x   Sq Epi: x / Non Sq Epi: x / Bacteria: x          CAPILLARY BLOOD GLUCOSE      POCT Blood Glucose.: 117 mg/dL (04 Feb 2025 22:26)  POCT Blood Glucose.: 138 mg/dL (04 Feb 2025 17:21)  POCT Blood Glucose.: 197 mg/dL (04 Feb 2025 12:38)  POCT Blood Glucose.: 152 mg/dL (04 Feb 2025 12:05)  POCT Blood Glucose.: 125 mg/dL (04 Feb 2025 06:49)      Drug Levels: [] N/A    CSF Analysis: [] N/A      Allergies    No Known Allergies    Intolerances      MEDICATIONS:  Antibiotics:    Neuro:  acetaminophen     Tablet .. 1000 milliGRAM(s) Oral every 8 hours  methocarbamol 500 milliGRAM(s) Oral every 8 hours  ondansetron   Disintegrating Tablet 4 milliGRAM(s) Oral every 6 hours  oxyCODONE    IR 10 milliGRAM(s) Oral every 4 hours PRN  oxyCODONE    IR 5 milliGRAM(s) Oral every 4 hours PRN  pregabalin 50 milliGRAM(s) Oral three times a day    Anticoagulation:    OTHER:  amLODIPine   Tablet 10 milliGRAM(s) Oral daily  insulin lispro (ADMELOG) corrective regimen sliding scale   SubCutaneous Before meals and at bedtime  pantoprazole    Tablet 40 milliGRAM(s) Oral before breakfast  polyethylene glycol 3350 17 Gram(s) Oral daily  senna 2 Tablet(s) Oral two times a day    IVF:  multivitamin 1 Tablet(s) Oral daily    CULTURES:    RADIOLOGY & ADDITIONAL TESTS:    M43.16    No pertinent family history in first degree relatives    Handoff    MEWS Score    HTN (hypertension)    DM (diabetes mellitus)    GERD (gastroesophageal reflux disease)    Spondylolisthesis    History of cholecystectomy    H/O eye surgery    Room Service Assist    SysAdmin_VstLnk        ASSESSMENT:  69M with PMHx HTN, GERD, DM2 diet-controlled presents with lower back pain with pain radiating to bilateral posterior lower extremities (L>R) and associated LLE weakness. Now s/p L5-S1 laminectomy, TLIF, PSF with plastics closure 2/3.     Plan:   Neuro:   - neuro/vitals q8hr  - pain control with spinal eras   - pending post op xrays  - x1 deep HMV, x1 superficial JESUS per plastics, monitor output     Cards:  - SBP <160  - hx HTN: c/w home amlodipine 5 mg, valsartan 160 mg   - home ASA 81mg daily held    RA:  - RA  - IS     GI:  - consistent carb diet  - bowel regimen, last BM 2/2  - hx GERD: c/w home omeprazole (need to confirm if pt still takes)     Renal:  - IVL  - f/u TOV    Endo:  - hx T2DM: ISS  - A1C 6.2    Heme:  - SCDs, hold SQL i/s/o fresh post op    ID:  - afebrile    Dispo: regional, full code, PT/OT rec home PT/OT.    Discussed with Dr. Pizarro      Assessment:  Present when checked    []  GCS  E   V  M     Heart Failure: []Acute, [] acute on chronic , []chronic  Heart Failure:  [] Diastolic (HFpEF), [] Systolic (HFrEF), []Combined (HFpEF and HFrEF), [] RHF, [] Pulm HTN, [] Other    [] JAYA, [] ATN, [] AIN, [] other  [] CKD1, [] CKD2, [] CKD 3, [] CKD 4, [] CKD 5, []ESRD    Encephalopathy: [] Metabolic, [] Hepatic, [] toxic, [] Neurological, [] Other    Abnormal Nurtitional Status: [] malnurtition (see nutrition note), [ ]underweight: BMI < 19, [] morbid obesity: BMI >40, [] Cachexia    [] Sepsis  [] hypovolemic shock,[] cardiogenic shock, [] hemorrhagic shock, [] neuogenic shock  [] Acute Respiratory Failure  []Cerebral edema, [] Brain compression/ herniation,   [] Functional quadriplegia  [] Acute blood loss anemia   HPI:    68 y/o male with PMHx DM diet controlled (HgbA1c 6.7%), HTN, GERD, presents for spine surgery.  He reports severe back pain and left greater than right leg pain.  He ambulates with a cane.  He reports numbess into both legs and reports left leg weakness when trying to walk.  He denies bowel or bladder changes, saddle anesthesia.  He has not taken any pain medication in the past month because it hasn't been helping. (03 Feb 2025 10:19)      Subjective:  No acute complaints.     Hospital course:  2/3: POD0 L5-S1 laminectomy, TLIF, PSF.  2/4: POD1, MARIAH overnight. PT/OT rec home PT/OT. Recieved IV dilaudid for pain in evening.  2/5: POD2, MARIAH overnight. bowel regimen increased.      Vital Signs Last 24 Hrs  T(C): 36.7 (04 Feb 2025 20:20), Max: 37.1 (04 Feb 2025 08:40)  T(F): 98.1 (04 Feb 2025 20:20), Max: 98.8 (04 Feb 2025 08:40)  HR: 68 (04 Feb 2025 20:20) (61 - 72)  BP: 131/71 (04 Feb 2025 20:20) (131/71 - 153/76)  BP(mean): 102 (04 Feb 2025 15:30) (101 - 102)  RR: 16 (04 Feb 2025 20:20) (16 - 17)  SpO2: 95% (04 Feb 2025 20:20) (93% - 96%)    Parameters below as of 04 Feb 2025 20:20  Patient On (Oxygen Delivery Method): room air        I&O's Summary    03 Feb 2025 07:01  -  04 Feb 2025 07:00  --------------------------------------------------------  IN: 950 mL / OUT: 1600 mL / NET: -650 mL    04 Feb 2025 07:01  -  05 Feb 2025 00:51  --------------------------------------------------------  IN: 0 mL / OUT: 1835 mL / NET: -1835 mL        PHYSICAL EXAM:  General: patient seen laying supine in bed in NAD, Slovak speaking  Neuro: AAOx3, follows commands, opens eyes spontaneously, speech clear and fluent,  face symmetric,  strength 5/5 b/l upper extremities and lower extremities, sensation intact to light touch throughout  HEENT: PERRL  Neck: supple  Cardiac: RRR, S1S2  Pulmonary: chest rise symmetric  Abdomen: soft, nontender, nondistended  Ext: perfusing well  Skin: warm, dry  Wound: posterior lumbar incision dressing c/d/i, HMV x1, JESUS x1      LABS:                        12.0   10.59 )-----------( 183      ( 04 Feb 2025 06:58 )             34.7     02-04    133[L]  |  99  |  16  ----------------------------<  132[H]  4.2   |  25  |  0.96    Ca    8.6      04 Feb 2025 06:58  Phos  3.6     02-04  Mg     1.7     02-04        Urinalysis Basic - ( 04 Feb 2025 06:58 )    Color: x / Appearance: x / SG: x / pH: x  Gluc: 132 mg/dL / Ketone: x  / Bili: x / Urobili: x   Blood: x / Protein: x / Nitrite: x   Leuk Esterase: x / RBC: x / WBC x   Sq Epi: x / Non Sq Epi: x / Bacteria: x          CAPILLARY BLOOD GLUCOSE      POCT Blood Glucose.: 117 mg/dL (04 Feb 2025 22:26)  POCT Blood Glucose.: 138 mg/dL (04 Feb 2025 17:21)  POCT Blood Glucose.: 197 mg/dL (04 Feb 2025 12:38)  POCT Blood Glucose.: 152 mg/dL (04 Feb 2025 12:05)  POCT Blood Glucose.: 125 mg/dL (04 Feb 2025 06:49)      Drug Levels: [] N/A    CSF Analysis: [] N/A      Allergies    No Known Allergies    Intolerances      MEDICATIONS:  Antibiotics:    Neuro:  acetaminophen     Tablet .. 1000 milliGRAM(s) Oral every 8 hours  methocarbamol 500 milliGRAM(s) Oral every 8 hours  ondansetron   Disintegrating Tablet 4 milliGRAM(s) Oral every 6 hours  oxyCODONE    IR 10 milliGRAM(s) Oral every 4 hours PRN  oxyCODONE    IR 5 milliGRAM(s) Oral every 4 hours PRN  pregabalin 50 milliGRAM(s) Oral three times a day    Anticoagulation:    OTHER:  amLODIPine   Tablet 10 milliGRAM(s) Oral daily  insulin lispro (ADMELOG) corrective regimen sliding scale   SubCutaneous Before meals and at bedtime  pantoprazole    Tablet 40 milliGRAM(s) Oral before breakfast  polyethylene glycol 3350 17 Gram(s) Oral daily  senna 2 Tablet(s) Oral two times a day    IVF:  multivitamin 1 Tablet(s) Oral daily    CULTURES:    RADIOLOGY & ADDITIONAL TESTS:    M43.16    No pertinent family history in first degree relatives    Handoff    MEWS Score    HTN (hypertension)    DM (diabetes mellitus)    GERD (gastroesophageal reflux disease)    Spondylolisthesis    History of cholecystectomy    H/O eye surgery    Room Service Assist    SysAdmin_VstLnk        ASSESSMENT:  69M with PMHx HTN, GERD, DM2 diet-controlled presents with lower back pain with pain radiating to bilateral posterior lower extremities (L>R) and associated LLE weakness. Now s/p L5-S1 laminectomy, TLIF, PSF with plastics closure 2/3.     Plan:   Neuro:   - neuro/vitals q8hr  - pain control with spinal eras   - pending post op xrays  - x1 deep HMV, x1 superficial JESUS per plastics, monitor output     Cards:  - SBP <160  - hx HTN: c/w home amlodipine 5 mg, valsartan 160 mg   - home ASA 81mg daily held    RA:  - RA  - IS     GI:  - consistent carb diet  - bowel regimen, last BM 2/2  - hx GERD: c/w home omeprazole (need to confirm if pt still takes)     Renal:  - IVL  - voiding    Endo:  - hx T2DM: ISS  - A1C 6.2    Heme:  - SCDs, SQL    ID:  - afebrile    Dispo: regional, full code, PT/OT rec home PT/OT.    Discussed with Dr. Pizarro      Assessment:  Present when checked    []  GCS  E   V  M     Heart Failure: []Acute, [] acute on chronic , []chronic  Heart Failure:  [] Diastolic (HFpEF), [] Systolic (HFrEF), []Combined (HFpEF and HFrEF), [] RHF, [] Pulm HTN, [] Other    [] JAYA, [] ATN, [] AIN, [] other  [] CKD1, [] CKD2, [] CKD 3, [] CKD 4, [] CKD 5, []ESRD    Encephalopathy: [] Metabolic, [] Hepatic, [] toxic, [] Neurological, [] Other    Abnormal Nurtitional Status: [] malnurtition (see nutrition note), [ ]underweight: BMI < 19, [] morbid obesity: BMI >40, [] Cachexia    [] Sepsis  [] hypovolemic shock,[] cardiogenic shock, [] hemorrhagic shock, [] neuogenic shock  [] Acute Respiratory Failure  []Cerebral edema, [] Brain compression/ herniation,   [] Functional quadriplegia  [] Acute blood loss anemia

## 2025-02-05 NOTE — DISCHARGE NOTE PROVIDER - NSDCFUADDINST_GEN_ALL_CORE_FT
Neurosurgery follow up appointment date/time:  - are staples/sutures in place?  - what day should staples/sutures be removed (POD 10-14)?  - please call the office to confirm appointment:     Wound Care:  - can patient shower?  - does dressing need to be changed/removed?  - no picking at incision  - wears glasses?   - pressure ulcer?     Devices:  - does patient need collar or brace or helmet?   - does collar/brace need to be worn at all times or just when OOB?  - RW or cane for ambulation?    Drains/Lines:  - PICC in place? ID follow up? (Paper Rx for: antibiotics, heparin flush, weekly lab draws)  - JESUS in place? Management?  - rodriguez in place? Management/Urology follow up?  - Tracheostomy?  - PEG tube?      Activity:  - fatigue is common after surgery, rest if you feel tired   - no bending, lifting, twisting or heavy lifting   - walking is recommended, ambulate as tolerated  - you may shower when you get home, keep your incision dry  - no soaking in a tub/pool/hot tub   - no driving within 24 hours of anesthesia or while taking prescription pain medications   - keep hydrated, drink plenty of water     Inpatient consults:  - final recommendations  - you will need follow up with....    Please also follow up with your primary care doctor.     Pain Expectations:  - pain after surgery is expected  - please take pain meds as prescribed     Medications:  - changes to home meds (ex. AED's)?  - new meds?  - pain meds?  - when can antiplatelets or anticoagulants be restarted?  - were adverse affects of meds discussed with patients?   - pain medications can cause constipation, you should eat a high fiber diet and may take a stool softener while on pain meds   - Avoid taking Advil (ibuprofen), Motrin (naproxen), or Aspirin for pain as they can cause bleeding     Call the office or come to ED if:  - wound has drainage or bleeding, increased redness or pain at incision site, neurological change, fever (>101), chills, night sweats, syncope, nausea/vomiting, chest pain, shortness of breath      Playback:  - are discharge instruction on playback?  - is a picture of the incision on playback?     WITHIN 24 HOURS OF DISCHARGE, PLEASE CONTACT NEURO PA  WITH ANY QUESTIONS OR CONCERNS: [phone]   OTHERWISE, PLEASE CALL THE OFFICE WITH ANY QUESTIONS OR CONCERNS: [phone] Neurosurgery follow up appointment date/time:  - follow up in the office for a wound check   - please call the office to confirm appointment: [phone]    Wound Care:  - shower daily and let soapy water run down your back  - gently clean incision with soapy water  - pat dry incision with a clean towel after showering  - leave incision uncovered, open to air  - no picking at incision     Devices:  - rolling walker for ambulation    Activity:  - fatigue is common after surgery, rest if you feel tired   - no bending, lifting, twisting or heavy lifting   - walking is recommended, ambulate as tolerated  - you may shower when you get home, keep your incision dry  - no soaking in a tub/pool/hot tub   - no driving within 24 hours of anesthesia or while taking prescription pain medications   - keep hydrated, drink plenty of water     Please also follow up with your primary care doctor.     Pain Expectations:  - pain after surgery is expected  - please take pain meds as prescribed     Medications:  - changes to home meds (ex. AED's)?  - new meds?  - pain meds?  - adverse affects of meds discussed with patient  - pain medications can cause constipation, you should eat a high fiber diet and may take a stool softener while on pain meds   - Avoid taking Advil (ibuprofen), Motrin (naproxen), or Aspirin for pain as they can cause bleeding     Call the office or come to ED if:  - wound has drainage or bleeding, increased redness or pain at incision site, neurological change, fever (>101), chills, night sweats, syncope, nausea/vomiting, chest pain, shortness of breath      Playback:  - see playback health for a copy of your discharge paperwork     WITHIN 24 HOURS OF DISCHARGE, PLEASE CONTACT NEURO PA  WITH ANY QUESTIONS OR CONCERNS: [phone]   OTHERWISE, PLEASE CALL THE OFFICE WITH ANY QUESTIONS OR CONCERNS: [phone] Neurosurgery follow up appointment date/time:  - follow up in the office for a wound check   - please call the office to confirm appointment: [phone]    Wound Care:  - shower daily and let soapy water run down your back  - gently clean incision with soapy water  - pat dry incision with a clean towel after showering  - leave incision uncovered, open to air  - no picking at incision     Devices:  - rolling walker for ambulation      Activity:  - fatigue is common after surgery, rest if you feel tired   - no bending, lifting, twisting or heavy lifting   - walking is recommended, ambulate as tolerated  - you may shower when you get home, keep your incision dry  - no soaking in a tub/pool/hot tub   - no driving within 24 hours of anesthesia or while taking prescription pain medications   - keep hydrated, drink plenty of water     Please also follow up with your primary care doctor.     Pain Expectations:  - pain after surgery is expected  - please take pain meds as prescribed     Medications:  - continue home medications as prescribed: Aspirin, Amlodipine, Omeprazole   - new meds:  Flomax 0.4mg at bedtime for frequent night time urination, likely in the setting of enlarged prostate (can cause dizziness)  - pain meds:  Lyrica 75mg every 8 hours for nerve pain (wean off as tolerated, can cause sleepiness and dizziness)  Robaxin 750mg every 8 hours as needed for muscle spasm (can cause sleepiness)  Tylenol 500mg every 6 hours as needed for mild to moderate pain (over the counter)  Oxycodone 5mg every 6 hours as needed for severe pain (can cause sleepiness, constipation)  *Do not take Lyrica, Robaxin or Oxycodone ever at the same time   Bowel regimen: Miralax as needed for constipation   - adverse affects of meds discussed with patient  - pain medications can cause constipation, you should eat a high fiber diet and may take a stool softener while on pain meds   - Avoid taking Advil (ibuprofen), Motrin (naproxen), or Aspirin for pain as they can cause bleeding     Call the office or come to ED if:  - wound has drainage or bleeding, increased redness or pain at incision site, neurological change, fever (>101), chills, night sweats, syncope, nausea/vomiting, chest pain, shortness of breath      Playback:  - see [facility] health for a copy of your discharge paperwork     WITHIN 24 HOURS OF DISCHARGE, PLEASE CONTACT NEURO PA  WITH ANY QUESTIONS OR CONCERNS: [phone]   OTHERWISE, PLEASE CALL THE OFFICE WITH ANY QUESTIONS OR CONCERNS: [phone] Neurosurgery follow up appointment date/time:  - follow up in the office for a wound check   - please call the office to confirm appointment: [phone]    Wound Care:  - shower daily and let soapy water run down your back  - gently clean incision with soapy water  - pat dry incision with a clean towel after showering  - leave incision uncovered, open to air  - no picking at incision     Devices:  - rolling walker for ambulation      Activity:  - fatigue is common after surgery, rest if you feel tired   - no bending, lifting, twisting or heavy lifting   - walking is recommended, ambulate as tolerated  - you may shower when you get home, keep your incision dry  - no soaking in a tub/pool/hot tub   - no driving within 24 hours of anesthesia or while taking prescription pain medications   - keep hydrated, drink plenty of water     Please also follow up with your primary care doctor.     Pain Expectations:  - pain after surgery is expected  - please take pain meds as prescribed     Medications:  - continue home medications as prescribed: Aspirin, Amlodipine, Omeprazole   - new meds:  Flomax 0.4mg at bedtime for frequent night time urination, likely in the setting of enlarged prostate (can cause dizziness)  - pain meds:  Lyrica 75mg every 8 hours for nerve pain (wean off as tolerated, can cause sleepiness and dizziness)  Flexeril 10mg every 8 hours as needed for muscle spasm (can cause sleepiness)  Tylenol 500mg every 6 hours as needed for mild to moderate pain (over the counter)  Oxycodone 5mg every 6 hours as needed for severe pain (can cause sleepiness, constipation)  *Do not take Lyrica, Robaxin or Oxycodone ever at the same time   Bowel regimen: Miralax as needed for constipation   - adverse affects of meds discussed with patient  - pain medications can cause constipation, you should eat a high fiber diet and may take a stool softener while on pain meds   - Avoid taking Advil (ibuprofen), Motrin (naproxen), or Aspirin for pain as they can cause bleeding     Call the office or come to ED if:  - wound has drainage or bleeding, increased redness or pain at incision site, neurological change, fever (>101), chills, night sweats, syncope, nausea/vomiting, chest pain, shortness of breath      Playback:  - see [facility] for a copy of your discharge paperwork     WITHIN 24 HOURS OF DISCHARGE, PLEASE CONTACT NEURO PA  WITH ANY QUESTIONS OR CONCERNS: [phone]   OTHERWISE, PLEASE CALL THE OFFICE WITH ANY QUESTIONS OR CONCERNS: [phone] Neurosurgery follow up appointment date/time: 2/21 at 12PM in the office with SAMEER Cabrera   - follow up in the office for a wound check   - please call the office to confirm appointment: [phone]    Wound Care:  - shower daily and let soapy water run down your back  - gently clean incision with soapy water  - pat dry incision with a clean towel after showering  - leave incision uncovered, open to air  - no picking at incision     Devices:  - rolling walker for ambulation      Activity:  - fatigue is common after surgery, rest if you feel tired   - no bending, lifting, twisting or heavy lifting   - walking is recommended, ambulate as tolerated  - you may shower when you get home, keep your incision dry  - no soaking in a tub/pool/hot tub   - no driving within 24 hours of anesthesia or while taking prescription pain medications   - keep hydrated, drink plenty of water     Please also follow up with your primary care doctor.     Pain Expectations:  - pain after surgery is expected  - please take pain meds as prescribed     Medications:  - continue home medications as prescribed: Aspirin, Amlodipine, Omeprazole   - new meds:  Flomax 0.4mg at bedtime for frequent night time urination, likely in the setting of enlarged prostate (can cause dizziness)  - pain meds:  Lyrica 75mg every 8 hours for nerve pain (wean off as tolerated, can cause sleepiness and dizziness)  Flexeril 10mg every 8 hours as needed for muscle spasm (can cause sleepiness)  Tylenol 500mg every 6 hours as needed for mild to moderate pain (over the counter)  Oxycodone 5mg every 6 hours as needed for severe pain (can cause sleepiness, constipation)  *Do not take Lyrica, Flexeril or Oxycodone ever at the same time   Bowel regimen: Miralax as needed for constipation   - adverse affects of meds discussed with patient  - pain medications can cause constipation, you should eat a high fiber diet and may take a stool softener while on pain meds   - Avoid taking Advil (ibuprofen), Motrin (naproxen), or Aspirin for pain as they can cause bleeding     Call the office or come to ED if:  - wound has drainage or bleeding, increased redness or pain at incision site, neurological change, fever (>101), chills, night sweats, syncope, nausea/vomiting, chest pain, shortness of breath      Playback:  - see [facility] health for a copy of your discharge paperwork     WITHIN 24 HOURS OF DISCHARGE, PLEASE CONTACT NEURO PA  WITH ANY QUESTIONS OR CONCERNS: [phone]   OTHERWISE, PLEASE CALL THE OFFICE WITH ANY QUESTIONS OR CONCERNS: [phone]

## 2025-02-05 NOTE — DISCHARGE NOTE PROVIDER - PROVIDER TOKENS
PROVIDER:[TOKEN:[[id number]]],PROVIDER:[TOKEN:[[id number]]],PROVIDER:[TOKEN:[[id number]]] PROVIDER:[TOKEN:[[id number]],SCHEDULEDAPPT:[02/21/2025],SCHEDULEDAPPTTIME:[12:00 PM]],PROVIDER:[TOKEN:[[id number]]],PROVIDER:[TOKEN:[[id number]]]

## 2025-02-05 NOTE — CONSULT NOTE ADULT - SUBJECTIVE AND OBJECTIVE BOX
NEUROSURGERY PAIN MANAGEMENT CONSULT NOTE    Chief Complaint: low back pain and radiating down b/l lower extremities (L>R)    HPI:  Taken from outpatient neurosurgery note " 68 y/o male with hx of HTN coming in for lower back pain. Pain radiating to bilateral posterior lower extremities (L>R). Reporting weakness of the left lower extremity. "  ,   68 y/o male with PMHx DM diet controlled (HgbA1c 6.7%), HTN, GERD, presents for spine surgery.  He reports severe back pain and left greater than right leg pain.  He ambulates with a cane.  He reports numbess into both legs and reports left leg weakness when trying to walk.  He denies bowel or bladder changes, saddle anesthesia.  He has not taken any pain medication in the past month because it hasn't been helping. (03 Feb 2025 10:19)      PAST MEDICAL & SURGICAL HISTORY:  HTN (hypertension)      DM (diabetes mellitus)  Denies      GERD (gastroesophageal reflux disease)      History of cholecystectomy      H/O eye surgery          FAMILY HISTORY:  No pertinent family history in first degree relatives        SOCIAL HISTORY:  [ ] Denies Smoking, Alcohol, or Drug Use    HOME MEDICATIONS:   Please refer to initial HNP    PAIN HOME MEDICATIONS:    Allergies    No Known Allergies    Intolerances        PAIN MEDICATIONS:  acetaminophen     Tablet .. 1000 milliGRAM(s) Oral every 8 hours  ketorolac   Injectable 15 milliGRAM(s) IV Push every 12 hours  methocarbamol 750 milliGRAM(s) Oral every 8 hours  ondansetron   Disintegrating Tablet 4 milliGRAM(s) Oral every 6 hours  oxyCODONE    IR 10 milliGRAM(s) Oral every 4 hours PRN  oxyCODONE    IR 5 milliGRAM(s) Oral every 4 hours PRN  pregabalin 75 milliGRAM(s) Oral every 8 hours    Heme:  enoxaparin Injectable 40 milliGRAM(s) SubCutaneous <User Schedule>    Antibiotics:    Cardiovascular:  amLODIPine   Tablet 10 milliGRAM(s) Oral daily    GI:  pantoprazole    Tablet 40 milliGRAM(s) Oral two times a day  polyethylene glycol 3350 17 Gram(s) Oral two times a day  senna 2 Tablet(s) Oral two times a day    Endocrine:  insulin lispro (ADMELOG) corrective regimen sliding scale   SubCutaneous Before meals and at bedtime    All Other Medications:  multivitamin 1 Tablet(s) Oral daily      Vital Signs Last 24 Hrs  T(C): 36.7 (05 Feb 2025 05:25), Max: 36.8 (04 Feb 2025 15:30)  T(F): 98 (05 Feb 2025 05:25), Max: 98.2 (04 Feb 2025 15:30)  HR: 63 (05 Feb 2025 05:25) (63 - 69)  BP: 138/78 (05 Feb 2025 05:25) (131/71 - 153/76)  BP(mean): 102 (04 Feb 2025 15:30) (102 - 102)  RR: 15 (05 Feb 2025 05:25) (15 - 16)  SpO2: 97% (05 Feb 2025 05:25) (95% - 97%)    Parameters below as of 05 Feb 2025 05:25  Patient On (Oxygen Delivery Method): room air        LABS:                        11.8   9.09  )-----------( 179      ( 05 Feb 2025 05:30 )             34.8     02-05    138  |  102  |  18  ----------------------------<  114[H]  4.0   |  27  |  1.08    Ca    8.4      05 Feb 2025 05:30  Phos  3.0     02-05  Mg     2.0     02-05        Urinalysis Basic - ( 05 Feb 2025 05:30 )    Color: x / Appearance: x / SG: x / pH: x  Gluc: 114 mg/dL / Ketone: x  / Bili: x / Urobili: x   Blood: x / Protein: x / Nitrite: x   Leuk Esterase: x / RBC: x / WBC x   Sq Epi: x / Non Sq Epi: x / Bacteria: x        RADIOLOGY:    Drug Screen:        REVIEW OF SYSTEMS:  CONSTITUTIONAL: No fever or fatigue O/N.   EYES: No eye pain, visual disturbances  ENMT:  No difficulty hearing. No throat pain  NECK: No pain or stiffness  RESPIRATORY: No cough, wheezing; No shortness of breath  CARDIOVASCULAR: No chest pain, palpitations.   GASTROINTESTINAL: Pt reports passing gas. No bowel movements. No abdominal or epigastric pain. No nausea, vomiting. GENITOURINARY: No dysuria, frequency, or incontinence  NEUROLOGICAL: No headaches, loss of strength, numbness, or tremors. No dizzinesss or lightheadedness with pain medications.   MUSCULOSKELETAL: Incisional back pain. No joint pain or swelling; No muscle, or extremity pain    PAIN ASSESSMENT: c/o low back pain, feels like tightness/stiffness. L hip numbness pain     PHYSICAL EXAM  GENERAL: Laying in bed, NAD  Neuro: CN II-XII PERRRL, EOMI  Cranial nerves grossly intact  Motor exam: MAEx4  Sensation intact to LT in UE/LE in 3 dermatomes  CHEST/LUNG: Clear to auscultation bilaterally; No rales, rhonchi, wheezing, or rubs  HEART: Regular rate and rhythm; No murmurs, rubs, or gallops  ABDOMEN: Soft, Nontender, Nondistended; Bowel sounds present  EXTREMITIES:  2+ Peripheral Pulses, No clubbing, cyanosis, or edema  SKIN: No rashes or lesions      ASSESSMENT:   69M with PMHx HTN, GERD, DM2 diet-controlled presents with lower back pain with pain radiating to bilateral posterior lower extremities (L>R) and associated LLE weakness. Now s/p L5-S1 laminectomy, TLIF, PSF with plastics closure 2/3.       PLAN:   - Pain:  Tylenol 1000mg every 8 hours  Increase Lyrica to 75mg every 8 hours  Increase Robaxin to 750mg every 8 hours  Oxycodone 5mg or 10mg every 4 hours as needed for moderate or severe pain  Start Toradol 15mg IV twice daily x3 days   Increase Protonix to BID while on Toradol (hx of GI ulcers)     - Bowel regimen: Senna    - Nausea ppx: Zofran standing  - Functional Goals: Pt will get OOB with PT today. Pt will resume previous level of activity without impairment from surgery.   - Additional Consults: None recommended.   - Additional Labs/Imaging:  None recommended.   - Follow up, Discharge Planning: home when ready  - Pain Management follow up plan: will continue to follow    d/w Dr. Ramirez  
69y old  Male who presents with a chief complaint of back and leg pain (05 Feb 2025 10:36)      HPI:     Used : [id number]    69 year old man with hx of Type 2 DM , HTN , GERD admitted to the hospital for lower back pain , he underwent L5-S1 laminectomy, TLIF, PSF. Post operatively he denies any shortness of breath , cough , fever , chills , nausea , was able tolerate diet. His only complaint was back pain.  Is able to walk with PT and sit in a chair. 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 1000 milliGRAM(s) Oral every 8 hours  amLODIPine   Tablet 10 milliGRAM(s) Oral daily  aspirin  chewable 81 milliGRAM(s) Oral daily  enoxaparin Injectable 40 milliGRAM(s) SubCutaneous <User Schedule>  insulin lispro (ADMELOG) corrective regimen sliding scale   SubCutaneous Before meals and at bedtime  ketorolac   Injectable 15 milliGRAM(s) IV Push every 12 hours  methocarbamol 750 milliGRAM(s) Oral every 8 hours  multivitamin 1 Tablet(s) Oral daily  ondansetron   Disintegrating Tablet 4 milliGRAM(s) Oral every 6 hours  oxyCODONE    IR 10 milliGRAM(s) Oral every 4 hours PRN  oxyCODONE    IR 5 milliGRAM(s) Oral every 4 hours PRN  pantoprazole    Tablet 40 milliGRAM(s) Oral two times a day  polyethylene glycol 3350 17 Gram(s) Oral two times a day  pregabalin 75 milliGRAM(s) Oral every 8 hours  senna 2 Tablet(s) Oral two times a day       Diet, Consistent Carbohydrate w/Evening Snack (02-03-25 @ 18:11) [Active]          VITAL SIGNS, INS/OUTS (last 24 hours):  Vital Signs Last 24 Hrs  T(C): 36.4 (05 Feb 2025 08:55), Max: 36.8 (04 Feb 2025 15:30)  T(F): 97.6 (05 Feb 2025 08:55), Max: 98.2 (04 Feb 2025 15:30)  HR: 79 (05 Feb 2025 08:55) (63 - 79)  BP: 113/72 (05 Feb 2025 08:55) (113/72 - 153/76)  BP(mean): 102 (04 Feb 2025 15:30) (102 - 102)  RR: 16 (05 Feb 2025 08:55) (15 - 16)  SpO2: 95% (05 Feb 2025 08:55) (95% - 97%)    Parameters below as of 05 Feb 2025 08:55  Patient On (Oxygen Delivery Method): room air      I&O's Summary    04 Feb 2025 07:01  -  05 Feb 2025 07:00  --------------------------------------------------------  IN: 0 mL / OUT: 1880 mL / NET: -1880 mL        Physical Exam   GENERAL: NAD, lying in bed comfortably  EYES: EOMI, PERRLA, conjunctiva and sclera clear  ENT: Moist mucous membranes, no mucosal lesions, normal dentition   NECK: Supple, No JVD  CHEST/LUNG: Clear to auscultation bilaterally; No rales, rhonchi, wheezing, or rubs. Unlabored respirations  HEART: Regular rate and rhythm; No murmurs, rubs, or gallops  ABDOMEN: Bowel sounds present; Soft, Nontender, Nondistended. No hepatomegaly  EXTREMITIES:  2+ Peripheral Pulses,  No clubbing, cyanosis, or edema  NERVOUS SYSTEM:  Alert & Oriented X3, speech clear. No deficits   MSK: FROM all 4 extremities, full and equal strength  SKIN: No rashes or lesions    LABS:                        11.8   9.09  )-----------( 179      ( 05 Feb 2025 05:30 )             34.8     02-05    138  |  102  |  18  ----------------------------<  114[H]  4.0   |  27  |  1.08    Ca    8.4      05 Feb 2025 05:30  Phos  3.0     02-05  Mg     2.0     02-05        Urinalysis Basic - ( 05 Feb 2025 05:30 )    Color: x / Appearance: x / SG: x / pH: x  Gluc: 114 mg/dL / Ketone: x  / Bili: x / Urobili: x   Blood: x / Protein: x / Nitrite: x   Leuk Esterase: x / RBC: x / WBC x   Sq Epi: x / Non Sq Epi: x / Bacteria: x      CAPILLARY BLOOD GLUCOSE      POCT Blood Glucose.: 243 mg/dL (05 Feb 2025 11:50)  POCT Blood Glucose.: 112 mg/dL (05 Feb 2025 06:37)  POCT Blood Glucose.: 117 mg/dL (04 Feb 2025 22:26)  POCT Blood Glucose.: 138 mg/dL (04 Feb 2025 17:21)      OTHER LABS:        MICRODATA:      IMAGING:      EKG:

## 2025-02-05 NOTE — CONSULT NOTE ADULT - ASSESSMENT
Patient is a 69y old  Male who presents with a chief complaint of back and leg pain (05 Feb 2025 10:36)      Impression and Plan   # Low back pain s/p  L5-S1 Laminectomy , TLIF, PSF with plastics closure   - Weightbearing status , Dressing changes and drain care per NSGY team    # Pre Diabetic , not on meds at home ,  currently blood sugars stable   - if FSG > 180 on 2 consecutive readings , start Tradjenta 5mg once daily   - Continue Sliding Scale Insulin     # HTN   - Continue Amlodipine , Valsartan     # GERD - Pantoprazole     # DVT prophylaxis - Lovenox

## 2025-02-06 LAB
ALBUMIN SERPL ELPH-MCNC: 3.5 G/DL — SIGNIFICANT CHANGE UP (ref 3.3–5)
ALP SERPL-CCNC: 93 U/L — SIGNIFICANT CHANGE UP (ref 40–120)
ALT FLD-CCNC: 94 U/L — HIGH (ref 10–45)
ANION GAP SERPL CALC-SCNC: 10 MMOL/L — SIGNIFICANT CHANGE UP (ref 5–17)
AST SERPL-CCNC: 52 U/L — HIGH (ref 10–40)
BASOPHILS # BLD AUTO: 0.03 K/UL — SIGNIFICANT CHANGE UP (ref 0–0.2)
BASOPHILS NFR BLD AUTO: 0.4 % — SIGNIFICANT CHANGE UP (ref 0–2)
BILIRUB SERPL-MCNC: 0.6 MG/DL — SIGNIFICANT CHANGE UP (ref 0.2–1.2)
BUN SERPL-MCNC: 16 MG/DL — SIGNIFICANT CHANGE UP (ref 7–23)
CALCIUM SERPL-MCNC: 8.8 MG/DL — SIGNIFICANT CHANGE UP (ref 8.4–10.5)
CHLORIDE SERPL-SCNC: 99 MMOL/L — SIGNIFICANT CHANGE UP (ref 96–108)
CO2 SERPL-SCNC: 28 MMOL/L — SIGNIFICANT CHANGE UP (ref 22–31)
CREAT SERPL-MCNC: 1.04 MG/DL — SIGNIFICANT CHANGE UP (ref 0.5–1.3)
EGFR: 78 ML/MIN/1.73M2 — SIGNIFICANT CHANGE UP
EOSINOPHIL # BLD AUTO: 0.23 K/UL — SIGNIFICANT CHANGE UP (ref 0–0.5)
EOSINOPHIL NFR BLD AUTO: 3.4 % — SIGNIFICANT CHANGE UP (ref 0–6)
GLUCOSE SERPL-MCNC: 100 MG/DL — HIGH (ref 70–99)
HCT VFR BLD CALC: 33.9 % — LOW (ref 39–50)
HGB BLD-MCNC: 11.8 G/DL — LOW (ref 13–17)
IMM GRANULOCYTES NFR BLD AUTO: 0.3 % — SIGNIFICANT CHANGE UP (ref 0–0.9)
LYMPHOCYTES # BLD AUTO: 2.12 K/UL — SIGNIFICANT CHANGE UP (ref 1–3.3)
LYMPHOCYTES # BLD AUTO: 31.5 % — SIGNIFICANT CHANGE UP (ref 13–44)
MAGNESIUM SERPL-MCNC: 2 MG/DL — SIGNIFICANT CHANGE UP (ref 1.6–2.6)
MCHC RBC-ENTMCNC: 31.6 PG — SIGNIFICANT CHANGE UP (ref 27–34)
MCHC RBC-ENTMCNC: 34.8 G/DL — SIGNIFICANT CHANGE UP (ref 32–36)
MCV RBC AUTO: 90.9 FL — SIGNIFICANT CHANGE UP (ref 80–100)
MONOCYTES # BLD AUTO: 1.05 K/UL — HIGH (ref 0–0.9)
MONOCYTES NFR BLD AUTO: 15.6 % — HIGH (ref 2–14)
NEUTROPHILS # BLD AUTO: 3.27 K/UL — SIGNIFICANT CHANGE UP (ref 1.8–7.4)
NEUTROPHILS NFR BLD AUTO: 48.8 % — SIGNIFICANT CHANGE UP (ref 43–77)
NRBC # BLD: 0 /100 WBCS — SIGNIFICANT CHANGE UP (ref 0–0)
NRBC BLD-RTO: 0 /100 WBCS — SIGNIFICANT CHANGE UP (ref 0–0)
PHOSPHATE SERPL-MCNC: 4.2 MG/DL — SIGNIFICANT CHANGE UP (ref 2.5–4.5)
PLATELET # BLD AUTO: 166 K/UL — SIGNIFICANT CHANGE UP (ref 150–400)
POTASSIUM SERPL-MCNC: 4 MMOL/L — SIGNIFICANT CHANGE UP (ref 3.5–5.3)
POTASSIUM SERPL-SCNC: 4 MMOL/L — SIGNIFICANT CHANGE UP (ref 3.5–5.3)
PROT SERPL-MCNC: 6 G/DL — SIGNIFICANT CHANGE UP (ref 6–8.3)
RBC # BLD: 3.73 M/UL — LOW (ref 4.2–5.8)
RBC # FLD: 13.3 % — SIGNIFICANT CHANGE UP (ref 10.3–14.5)
SODIUM SERPL-SCNC: 137 MMOL/L — SIGNIFICANT CHANGE UP (ref 135–145)
WBC # BLD: 6.72 K/UL — SIGNIFICANT CHANGE UP (ref 3.8–10.5)
WBC # FLD AUTO: 6.72 K/UL — SIGNIFICANT CHANGE UP (ref 3.8–10.5)

## 2025-02-06 PROCEDURE — 99233 SBSQ HOSP IP/OBS HIGH 50: CPT

## 2025-02-06 RX ORDER — TAMSULOSIN HYDROCHLORIDE 0.4 MG/1
0.4 CAPSULE ORAL AT BEDTIME
Refills: 0 | Status: DISCONTINUED | OUTPATIENT
Start: 2025-02-06 | End: 2025-02-07

## 2025-02-06 RX ORDER — ACETAMINOPHEN 160 MG/5ML
650 SUSPENSION ORAL EVERY 6 HOURS
Refills: 0 | Status: DISCONTINUED | OUTPATIENT
Start: 2025-02-06 | End: 2025-02-06

## 2025-02-06 RX ORDER — ACETAMINOPHEN 160 MG/5ML
1000 SUSPENSION ORAL EVERY 8 HOURS
Refills: 0 | Status: DISCONTINUED | OUTPATIENT
Start: 2025-02-06 | End: 2025-02-07

## 2025-02-06 RX ORDER — LACTULOSE 10 G/15 ML
10 SOLUTION, ORAL ORAL ONCE
Refills: 0 | Status: COMPLETED | OUTPATIENT
Start: 2025-02-06 | End: 2025-02-06

## 2025-02-06 RX ADMIN — Medication 2 TABLET(S): at 17:38

## 2025-02-06 RX ADMIN — PREGABALIN CAPSULES, CV 75 MILLIGRAM(S): 225 CAPSULE ORAL at 13:12

## 2025-02-06 RX ADMIN — PREGABALIN CAPSULES, CV 75 MILLIGRAM(S): 225 CAPSULE ORAL at 21:36

## 2025-02-06 RX ADMIN — Medication 1 TABLET(S): at 13:12

## 2025-02-06 RX ADMIN — PANTOPRAZOLE 40 MILLIGRAM(S): 20 TABLET, DELAYED RELEASE ORAL at 17:38

## 2025-02-06 RX ADMIN — PANTOPRAZOLE 40 MILLIGRAM(S): 20 TABLET, DELAYED RELEASE ORAL at 05:42

## 2025-02-06 RX ADMIN — TAMSULOSIN HYDROCHLORIDE 0.4 MILLIGRAM(S): 0.4 CAPSULE ORAL at 21:36

## 2025-02-06 RX ADMIN — ACETAMINOPHEN 1000 MILLIGRAM(S): 160 SUSPENSION ORAL at 13:12

## 2025-02-06 RX ADMIN — OXYCODONE HYDROCHLORIDE 10 MILLIGRAM(S): 30 TABLET ORAL at 18:13

## 2025-02-06 RX ADMIN — PREGABALIN CAPSULES, CV 75 MILLIGRAM(S): 225 CAPSULE ORAL at 05:42

## 2025-02-06 RX ADMIN — OXYCODONE HYDROCHLORIDE 10 MILLIGRAM(S): 30 TABLET ORAL at 17:38

## 2025-02-06 RX ADMIN — Medication 750 MILLIGRAM(S): at 21:36

## 2025-02-06 RX ADMIN — ASPIRIN 81 MILLIGRAM(S): 81 TABLET, COATED ORAL at 13:12

## 2025-02-06 RX ADMIN — POLYETHYLENE GLYCOL 3350 17 GRAM(S): 17 POWDER, FOR SOLUTION ORAL at 05:43

## 2025-02-06 RX ADMIN — Medication 750 MILLIGRAM(S): at 13:12

## 2025-02-06 RX ADMIN — ACETAMINOPHEN 1000 MILLIGRAM(S): 160 SUSPENSION ORAL at 21:36

## 2025-02-06 RX ADMIN — Medication 15 MILLIGRAM(S): at 07:38

## 2025-02-06 RX ADMIN — Medication 15 MILLIGRAM(S): at 19:19

## 2025-02-06 RX ADMIN — Medication 10 MILLIGRAM(S): at 05:43

## 2025-02-06 RX ADMIN — Medication 2 TABLET(S): at 05:42

## 2025-02-06 RX ADMIN — ENOXAPARIN SODIUM 40 MILLIGRAM(S): 100 INJECTION SUBCUTANEOUS at 21:36

## 2025-02-06 RX ADMIN — Medication 750 MILLIGRAM(S): at 05:43

## 2025-02-06 RX ADMIN — POLYETHYLENE GLYCOL 3350 17 GRAM(S): 17 POWDER, FOR SOLUTION ORAL at 17:38

## 2025-02-06 NOTE — DIETITIAN INITIAL EVALUATION ADULT - NUTRITIONGOAL OUTCOME1
Pt to consistently meet at least 75% of estimated energy expenditure via tolerated route that is consistent with goals of care during hospital stay

## 2025-02-06 NOTE — DIETITIAN INITIAL EVALUATION ADULT - ADD RECOMMEND
1. Continue with current diet order (consistent carbohydrate diet)  **provide additional nourishments and/or oral nutrition supplements per pt preferences and/or if pt's PO intakes are consistently <50%**  2. Encourage pt to meet nutritional needs as able  3. Monitor PO intakes, trend weights (weekly), monitor skin integrity, monitor labs (electrolytes, CMP), monitor GI function  4. Encourage adherence to diet education (reinforce as able)   5. Continue insulin regimen to promote euglycemia   6. Pain and bowel regimen per team  7. Will continue to assess/honor preferences as able   8. Align nutrition interventions with goals of care at all times

## 2025-02-06 NOTE — PROGRESS NOTE ADULT - SUBJECTIVE AND OBJECTIVE BOX
Patient is a 69y old  Male who presents with a chief complaint of back and leg pain (06 Feb 2025 00:50)        SUBJECTIVE:  Patient was seen and examined at bedside.    Overnight Events : reports difficulty urinating post surgery,  on further enquire he also reports that he had nocturia prior to surgery     Last Bowel Movement: 02-Feb-2025 (02-06)  Last Bowel Movement: 02-Feb-2025 (02-05)  Last Bowel Movement: 02-Feb-2025 (02-04)      Review of systems: 12 point Review of systems negative unless otherwise documented elsewhere in note.     Diet, Consistent Carbohydrate w/Evening Snack (02-03-25 @ 18:11) [Active]      MEDICATIONS:  MEDICATIONS  (STANDING):  acetaminophen     Tablet .. 1000 milliGRAM(s) Oral every 8 hours  amLODIPine   Tablet 10 milliGRAM(s) Oral daily  aspirin  chewable 81 milliGRAM(s) Oral daily  enoxaparin Injectable 40 milliGRAM(s) SubCutaneous <User Schedule>  insulin lispro (ADMELOG) corrective regimen sliding scale   SubCutaneous Before meals and at bedtime  ketorolac   Injectable 15 milliGRAM(s) IV Push every 12 hours  methocarbamol 750 milliGRAM(s) Oral every 8 hours  multivitamin 1 Tablet(s) Oral daily  pantoprazole    Tablet 40 milliGRAM(s) Oral two times a day  polyethylene glycol 3350 17 Gram(s) Oral two times a day  pregabalin 75 milliGRAM(s) Oral every 8 hours  senna 2 Tablet(s) Oral two times a day  tamsulosin 0.4 milliGRAM(s) Oral at bedtime    MEDICATIONS  (PRN):  oxyCODONE    IR 10 milliGRAM(s) Oral every 4 hours PRN Severe Pain (7 - 10)  oxyCODONE    IR 5 milliGRAM(s) Oral every 4 hours PRN Moderate Pain (4 - 6)      Allergies    No Known Allergies    Intolerances        OBJECTIVE:  Vital Signs Last 24 Hrs  T(C): 36.8 (06 Feb 2025 08:20), Max: 36.9 (05 Feb 2025 20:20)  T(F): 98.2 (06 Feb 2025 08:20), Max: 98.5 (05 Feb 2025 20:20)  HR: 68 (06 Feb 2025 08:20) (62 - 68)  BP: 127/77 (06 Feb 2025 08:20) (115/71 - 130/72)  BP(mean): --  RR: 16 (06 Feb 2025 08:20) (16 - 17)  SpO2: 93% (06 Feb 2025 08:20) (93% - 96%)    Parameters below as of 06 Feb 2025 08:20  Patient On (Oxygen Delivery Method): room air            PHYSICAL EXAM:  Gen: Resting in bed at time of exam, not in distress   HEENT: moist mucosa, no lesions   Neck: supple, trachea at midline  CV: RRR, +S1/S2  Pulm: no wheezing , no crackles  no increase in work of breathing  Abd: soft, NTND  Skin: warm and dry, no new rashes   Ext: moving all 4 extremities spontaneously , no edema  ,  Neuro: AOx3, no gross focal neurological deficits  Psych: affect and behavior appropriate, pleasant at time of interview    LABS:                                   11.8   6.72  )-----------( 166      ( 06 Feb 2025 05:30 )             33.9     02-06    137  |  99  |  16  ----------------------------<  100[H]  4.0   |  28  |  1.04    Ca    8.8      06 Feb 2025 05:30  Phos  4.2     02-06  Mg     2.0     02-06    TPro  6.0  /  Alb  3.5  /  TBili  0.6  /  DBili  x   /  AST  52[H]  /  ALT  94[H]  /  AlkPhos  93  02-06      CAPILLARY BLOOD GLUCOSE      POCT Blood Glucose.: 118 mg/dL (06 Feb 2025 12:06)  POCT Blood Glucose.: 97 mg/dL (06 Feb 2025 06:49)  POCT Blood Glucose.: 108 mg/dL (05 Feb 2025 21:36)  POCT Blood Glucose.: 92 mg/dL (05 Feb 2025 17:58)          MICRODATA:      RADIOLOGY/OTHER STUDIES:

## 2025-02-06 NOTE — PROGRESS NOTE ADULT - SUBJECTIVE AND OBJECTIVE BOX
HPI:  Taken from outpatient neurosurgery note " 70 y/o male with hx of HTN coming in for lower back pain. Pain radiating to bilateral posterior lower extremities (L>R). Reporting weakness of the left lower extremity. "  ,   70 y/o male with PMHx DM diet controlled (HgbA1c 6.7%), HTN, GERD, presents for spine surgery.  He reports severe back pain and left greater than right leg pain.  He ambulates with a cane.  He reports numbess into both legs and reports left leg weakness when trying to walk.  He denies bowel or bladder changes, saddle anesthesia.  He has not taken any pain medication in the past month because it hasn't been helping. (03 Feb 2025 10:19)    INTERVAL EVENTS: POD 3, MARIAH ovn    Hospital course:   2/3: POD0 L5-S1 laminectomy, TLIF, PSF.  2/4: POD1, MARIAH overnight. PT/OT rec home PT/OT. Recieved IV dilaudid for pain in evening.  2/5: POD2, MARIAH overnight. bowel regimen increased. SQL prophylaxis started.  2/6: POD 3, MARIAH ovn    Vital Signs Last 24 Hrs  T(C): 36.9 (05 Feb 2025 20:20), Max: 36.9 (05 Feb 2025 20:20)  T(F): 98.5 (05 Feb 2025 20:20), Max: 98.5 (05 Feb 2025 20:20)  HR: 65 (05 Feb 2025 20:20) (62 - 79)  BP: 130/72 (05 Feb 2025 20:20) (113/72 - 138/78)  BP(mean): --  RR: 16 (05 Feb 2025 20:20) (15 - 16)  SpO2: 96% (05 Feb 2025 20:20) (95% - 97%)    Parameters below as of 05 Feb 2025 20:20  Patient On (Oxygen Delivery Method): room air        I&O's Summary    04 Feb 2025 07:01  -  05 Feb 2025 07:00  --------------------------------------------------------  IN: 0 mL / OUT: 1880 mL / NET: -1880 mL    05 Feb 2025 07:01  -  06 Feb 2025 00:50  --------------------------------------------------------  IN: 0 mL / OUT: 120 mL / NET: -120 mL        PHYSICAL EXAM:  General: patient seen laying supine in bed in NAD, Serbian speaking  Neuro: AAOx3, follows commands, opens eyes spontaneously, speech clear and fluent,  face symmetric,  strength 5/5 b/l upper extremities and lower extremities, sensation intact to light touch throughout  HEENT: PERRL  Neck: supple  Cardiac: RRR, S1S2  Pulmonary: chest rise symmetric  Abdomen: soft, nontender, nondistended  Ext: perfusing well  Skin: warm, dry  Wound: posterior lumbar incision dressing c/d/i, HMV x1, JESUS x1     TUBES/LINES:  [] [place]  [] A-line  [] Lumbar Drain  [] Wound Drains  [] NGT   [] EVD   [] CVC  [] Other      DIET:  [] NPO  [x] Mechanical  [] Tube feeds    LABS:                        11.8   9.09  )-----------( 179      ( 05 Feb 2025 05:30 )             34.8     02-05    138  |  102  |  18  ----------------------------<  114[H]  4.0   |  27  |  1.08    Ca    8.4      05 Feb 2025 05:30  Phos  3.0     02-05  Mg     2.0     02-05        Urinalysis Basic - ( 05 Feb 2025 05:30 )    Color: x / Appearance: x / SG: x / pH: x  Gluc: 114 mg/dL / Ketone: x  / Bili: x / Urobili: x   Blood: x / Protein: x / Nitrite: x   Leuk Esterase: x / RBC: x / WBC x   Sq Epi: x / Non Sq Epi: x / Bacteria: x          CAPILLARY BLOOD GLUCOSE      POCT Blood Glucose.: 108 mg/dL (05 Feb 2025 21:36)  POCT Blood Glucose.: 92 mg/dL (05 Feb 2025 17:58)  POCT Blood Glucose.: 243 mg/dL (05 Feb 2025 11:50)  POCT Blood Glucose.: 112 mg/dL (05 Feb 2025 06:37)      Drug Levels: [] N/A    CSF Analysis: [] N/A      Allergies    No Known Allergies    Intolerances        Home Medications:  amLODIPine 10 mg oral tablet: 1 tab(s) orally once a day (03 Feb 2025 10:06)  aspirin 81 mg oral delayed release tablet: 1 tab(s) orally once a day (03 Feb 2025 10:27)  omeprazole 40 mg oral delayed release capsule: 1 cap(s) orally once a day (03 Feb 2025 10:27)  valsartan 160 mg oral capsule: orally once a day (03 Feb 2025 10:06)      MEDICATIONS:  MEDICATIONS  (STANDING):  amLODIPine   Tablet 10 milliGRAM(s) Oral daily  aspirin  chewable 81 milliGRAM(s) Oral daily  enoxaparin Injectable 40 milliGRAM(s) SubCutaneous <User Schedule>  insulin lispro (ADMELOG) corrective regimen sliding scale   SubCutaneous Before meals and at bedtime  ketorolac   Injectable 15 milliGRAM(s) IV Push every 12 hours  lactulose Syrup 10 Gram(s) Oral once  methocarbamol 750 milliGRAM(s) Oral every 8 hours  multivitamin 1 Tablet(s) Oral daily  pantoprazole    Tablet 40 milliGRAM(s) Oral two times a day  polyethylene glycol 3350 17 Gram(s) Oral two times a day  pregabalin 75 milliGRAM(s) Oral every 8 hours  senna 2 Tablet(s) Oral two times a day    MEDICATIONS  (PRN):  acetaminophen     Tablet .. 650 milliGRAM(s) Oral every 6 hours PRN Temp greater or equal to 38C (100.4F), Mild Pain (1 - 3)  oxyCODONE    IR 10 milliGRAM(s) Oral every 4 hours PRN Severe Pain (7 - 10)  oxyCODONE    IR 5 milliGRAM(s) Oral every 4 hours PRN Moderate Pain (4 - 6)      CULTURES:      RADIOLOGY & ADDITIONAL TESTS:      ASSESSMENT:  69M with PMHx HTN, GERD, DM2 diet-controlled presents with lower back pain with pain radiating to bilateral posterior lower extremities (L>R) and associated LLE weakness. Now s/p L5-S1 laminectomy, TLIF, PSF with plastics closure 2/3.     Plan:   Neuro:   - neuro/vitals q8hr  - pain control with spinal eras   - post op xrays completed 2/5  - x1 deep HMV, x1 superficial JESUS per plastics, monitor output     Cards:  - SBP <160  - hx HTN: c/w home amlodipine 5 mg, valsartan 160 mg   - home ASA 81mg daily     RA:  - RA  - IS     GI:  - consistent carb diet  - bowel regimen, last BM 2/2  - hx GERD: Protonix increased to BID while on Toradol     Renal:  - IVL  - voiding    Endo:  - hx T2DM: ISS  - A1C 6.2    Heme:  - SCDs and SQL for DVT prophylaxis    ID:  - afebrile    Dispo: regional, full code, PT/OT rec home PT/OT.    Discussed with Dr. Pizarro      Assessment: present when checked     [] GCS   E   V   M     Heart Failure: [] Acute, [] acute on chronic, [] chronic   Heart Failure: [] Diastolic (HFpEF), [] Systolic (HRrEF), [] Combined (HFpEF and HFrEF), [] RHF, [] Pulm HTN, [] Other     [] JAYA, [] ATN, [] AIN, [] other   [] CKD1, [] CKD2, [] CKD3, [] CKD4, [] CKD5, [] ESRD     Encephalopathy: [] Metabolic, [] Hepatic, [] Toxic, [] Neurological, [] Other     Abnormal Nutritional Status: [] malnutrition (see nutrition note), []underweight: BMI <19, [] morbid obesity: BMI >40, [] Cachexia     [] Sepsis   [] Hypovolemic shock, [] Cardiogenic shock, [] Hemorrhagic shock, [] Neurogenic shock   [] Acute respiratory failure   [] Cerebral edema, [] Brain compression / herniation   [] Functional quadriplegia   [] Acute blood loss anemia

## 2025-02-06 NOTE — DIETITIAN INITIAL EVALUATION ADULT - OTHER CALCULATIONS
Pt is within ~% ideal body weight, thus actual body weight used for all calculations. Needs adjusted for advanced age and physiological demands, postoperative status.

## 2025-02-06 NOTE — DIETITIAN INITIAL EVALUATION ADULT - PERTINENT MEDS FT
MEDICATIONS  (STANDING):  acetaminophen     Tablet .. 1000 milliGRAM(s) Oral every 8 hours  amLODIPine   Tablet 10 milliGRAM(s) Oral daily  aspirin  chewable 81 milliGRAM(s) Oral daily  enoxaparin Injectable 40 milliGRAM(s) SubCutaneous <User Schedule>  insulin lispro (ADMELOG) corrective regimen sliding scale   SubCutaneous Before meals and at bedtime  ketorolac   Injectable 15 milliGRAM(s) IV Push every 12 hours  methocarbamol 750 milliGRAM(s) Oral every 8 hours  multivitamin 1 Tablet(s) Oral daily  pantoprazole    Tablet 40 milliGRAM(s) Oral two times a day  polyethylene glycol 3350 17 Gram(s) Oral two times a day  pregabalin 75 milliGRAM(s) Oral every 8 hours  senna 2 Tablet(s) Oral two times a day  tamsulosin 0.4 milliGRAM(s) Oral at bedtime    MEDICATIONS  (PRN):  oxyCODONE    IR 10 milliGRAM(s) Oral every 4 hours PRN Severe Pain (7 - 10)  oxyCODONE    IR 5 milliGRAM(s) Oral every 4 hours PRN Moderate Pain (4 - 6)

## 2025-02-06 NOTE — DIETITIAN INITIAL EVALUATION ADULT - PERSON TAUGHT/METHOD
Pt and spouse amenable to education; RD provided education in regards to the importance of adequate macro and micronutrients, as well as hydration to support ADLs, maintain energy levels and overall functional/nutritional status. General healthful education provided. Nutrient-dense foods promoted. Pt's diet reviewed. RD discussed pt's elevated nutrient needs related to postoperative demands, emphasizing the role that protein plays in the healing process. Pt and spouse appeared overall receptive and verbalized understanding. Pt was receptive and verbalized understanding./verbal instruction/patient instructed

## 2025-02-06 NOTE — DIETITIAN INITIAL EVALUATION ADULT - ENTER FROM (G/KG)
Patient initially escalated due to no response, however patient entered vitals prior to phone call. Vital signs and symptoms did not trigger a second escalation; therefore, no follow up call is needed at this time.    Reason for Disposition   Caller has cancelled the call before the first contact    Protocols used: NO CONTACT OR DUPLICATE CONTACT CALL-A-AH       1.2

## 2025-02-06 NOTE — DIETITIAN INITIAL EVALUATION ADULT - PERTINENT LABORATORY DATA
02-06    137  |  99  |  16  ----------------------------<  100[H]  4.0   |  28  |  1.04    Ca    8.8      06 Feb 2025 05:30  Phos  4.2     02-06  Mg     2.0     02-06    TPro  6.0  /  Alb  3.5  /  TBili  0.6  /  DBili  x   /  AST  52[H]  /  ALT  94[H]  /  AlkPhos  93  02-06  POCT Blood Glucose.: 118 mg/dL (02-06-25 @ 12:06)  A1C with Estimated Average Glucose Result: 6.2 % (02-04-25 @ 06:58)

## 2025-02-06 NOTE — DIETITIAN INITIAL EVALUATION ADULT - OTHER INFO
This is a 69 year old male with a past medical history significant for diabetes, diet controlled (HgbA1c 6.7%), HTN, GERD, presents for spine surgery. He reports severe back pain and left greater than right leg pain. He ambulates with a cane. He reports numbness into both legs and reports left leg weakness when trying to walk.    Pt seen in room for nutrition assessment. Pt's spouse was present at bedside, pt appeared sleepy/resting, spouse answered most of RD's questions. Dietitian offered / services, patient/family refused this per preference. Pt noted with a fair to good appetite PTA and during hospital stay. As per diet recall PTA: pt eats various fruits, vegetables, grains/starches, lean protein sources, healthy fats/oils, etc. Pt's spouse cooks for him. Currently on a consistent carbohydrate diet, tolerating well, noted with ~65-75% PO intakes overall. No cultural, Latter day, or ethnic food preferences noted. No known food allergies. No supplements (micronutrient, oral nutrition supplements) at home noted. No noted significant/major wt changes, reports wt stability at current wt. Dosing wt: ~195.5 pounds, Ideal body weight: ~172 pounds, pt is ~114% of ideal body weight. No noted GI upset such as nausea, vomiting, diarrhea, constipation, last 2/2/25. No edema. Skin: surgical incisions to the back. NO pressure ulcers noted. Warren: 19. No issues chewing or swallowing noted. No pain/discomfort pain. Labs reviewed: elevated AST (52), ALT (94), medical team is aware; RD to continue to monitor trends. Nutritionally pertinent medications/supplements: insulin, protonix, multivitamin supplementation. Pt and spouse amenable to education; RD provided education in regards to the importance of adequate macro and micronutrients, as well as hydration to support ADLs, maintain energy levels and overall functional/nutritional status. General healthful education provided. Nutrient-dense foods promoted. Pt's diet reviewed. RD discussed pt's elevated nutrient needs related to postoperative demands, emphasizing the role that protein plays in the healing process. Pt and spouse appeared overall receptive and verbalized understanding. Pt was receptive and verbalized understanding. No additional nutrition-related concerns. Will continue to follow. Additional nutrition recommendations below to follow.

## 2025-02-07 ENCOUNTER — TRANSCRIPTION ENCOUNTER (OUTPATIENT)
Age: 70
End: 2025-02-07

## 2025-02-07 VITALS
OXYGEN SATURATION: 98 % | HEART RATE: 59 BPM | TEMPERATURE: 98 F | RESPIRATION RATE: 17 BRPM | DIASTOLIC BLOOD PRESSURE: 60 MMHG | SYSTOLIC BLOOD PRESSURE: 116 MMHG

## 2025-02-07 PROBLEM — I10 ESSENTIAL (PRIMARY) HYPERTENSION: Chronic | Status: ACTIVE | Noted: 2025-01-31

## 2025-02-07 PROBLEM — K21.9 GASTRO-ESOPHAGEAL REFLUX DISEASE WITHOUT ESOPHAGITIS: Chronic | Status: ACTIVE | Noted: 2025-02-03

## 2025-02-07 PROBLEM — E11.9 TYPE 2 DIABETES MELLITUS WITHOUT COMPLICATIONS: Chronic | Status: ACTIVE | Noted: 2025-01-31

## 2025-02-07 LAB
ANION GAP SERPL CALC-SCNC: 10 MMOL/L — SIGNIFICANT CHANGE UP (ref 5–17)
BUN SERPL-MCNC: 13 MG/DL — SIGNIFICANT CHANGE UP (ref 7–23)
CALCIUM SERPL-MCNC: 8.6 MG/DL — SIGNIFICANT CHANGE UP (ref 8.4–10.5)
CHLORIDE SERPL-SCNC: 100 MMOL/L — SIGNIFICANT CHANGE UP (ref 96–108)
CO2 SERPL-SCNC: 27 MMOL/L — SIGNIFICANT CHANGE UP (ref 22–31)
CREAT SERPL-MCNC: 1.02 MG/DL — SIGNIFICANT CHANGE UP (ref 0.5–1.3)
EGFR: 80 ML/MIN/1.73M2 — SIGNIFICANT CHANGE UP
GLUCOSE SERPL-MCNC: 107 MG/DL — HIGH (ref 70–99)
HCT VFR BLD CALC: 35.1 % — LOW (ref 39–50)
HGB BLD-MCNC: 11.8 G/DL — LOW (ref 13–17)
MAGNESIUM SERPL-MCNC: 2 MG/DL — SIGNIFICANT CHANGE UP (ref 1.6–2.6)
MCHC RBC-ENTMCNC: 30 PG — SIGNIFICANT CHANGE UP (ref 27–34)
MCHC RBC-ENTMCNC: 33.6 G/DL — SIGNIFICANT CHANGE UP (ref 32–36)
MCV RBC AUTO: 89.3 FL — SIGNIFICANT CHANGE UP (ref 80–100)
NRBC # BLD: 0 /100 WBCS — SIGNIFICANT CHANGE UP (ref 0–0)
NRBC BLD-RTO: 0 /100 WBCS — SIGNIFICANT CHANGE UP (ref 0–0)
PHOSPHATE SERPL-MCNC: 3.6 MG/DL — SIGNIFICANT CHANGE UP (ref 2.5–4.5)
PLATELET # BLD AUTO: 189 K/UL — SIGNIFICANT CHANGE UP (ref 150–400)
POTASSIUM SERPL-MCNC: 4 MMOL/L — SIGNIFICANT CHANGE UP (ref 3.5–5.3)
POTASSIUM SERPL-SCNC: 4 MMOL/L — SIGNIFICANT CHANGE UP (ref 3.5–5.3)
RBC # BLD: 3.93 M/UL — LOW (ref 4.2–5.8)
RBC # FLD: 13 % — SIGNIFICANT CHANGE UP (ref 10.3–14.5)
SODIUM SERPL-SCNC: 137 MMOL/L — SIGNIFICANT CHANGE UP (ref 135–145)
WBC # BLD: 4.95 K/UL — SIGNIFICANT CHANGE UP (ref 3.8–10.5)
WBC # FLD AUTO: 4.95 K/UL — SIGNIFICANT CHANGE UP (ref 3.8–10.5)

## 2025-02-07 PROCEDURE — 97535 SELF CARE MNGMENT TRAINING: CPT

## 2025-02-07 PROCEDURE — 72100 X-RAY EXAM L-S SPINE 2/3 VWS: CPT

## 2025-02-07 PROCEDURE — 84100 ASSAY OF PHOSPHORUS: CPT

## 2025-02-07 PROCEDURE — 82962 GLUCOSE BLOOD TEST: CPT

## 2025-02-07 PROCEDURE — 85027 COMPLETE CBC AUTOMATED: CPT

## 2025-02-07 PROCEDURE — 83735 ASSAY OF MAGNESIUM: CPT

## 2025-02-07 PROCEDURE — 97162 PT EVAL MOD COMPLEX 30 MIN: CPT

## 2025-02-07 PROCEDURE — 97165 OT EVAL LOW COMPLEX 30 MIN: CPT

## 2025-02-07 PROCEDURE — 86850 RBC ANTIBODY SCREEN: CPT

## 2025-02-07 PROCEDURE — 86900 BLOOD TYPING SEROLOGIC ABO: CPT

## 2025-02-07 PROCEDURE — 76000 FLUOROSCOPY <1 HR PHYS/QHP: CPT

## 2025-02-07 PROCEDURE — 86901 BLOOD TYPING SEROLOGIC RH(D): CPT

## 2025-02-07 PROCEDURE — 85025 COMPLETE CBC W/AUTO DIFF WBC: CPT

## 2025-02-07 PROCEDURE — 80048 BASIC METABOLIC PNL TOTAL CA: CPT

## 2025-02-07 PROCEDURE — 97530 THERAPEUTIC ACTIVITIES: CPT

## 2025-02-07 PROCEDURE — C1889: CPT

## 2025-02-07 PROCEDURE — 36415 COLL VENOUS BLD VENIPUNCTURE: CPT

## 2025-02-07 PROCEDURE — 97116 GAIT TRAINING THERAPY: CPT

## 2025-02-07 PROCEDURE — C1713: CPT

## 2025-02-07 PROCEDURE — 97110 THERAPEUTIC EXERCISES: CPT

## 2025-02-07 PROCEDURE — 80053 COMPREHEN METABOLIC PANEL: CPT

## 2025-02-07 PROCEDURE — 83036 HEMOGLOBIN GLYCOSYLATED A1C: CPT

## 2025-02-07 PROCEDURE — 99232 SBSQ HOSP IP/OBS MODERATE 35: CPT

## 2025-02-07 RX ORDER — OXYCODONE HYDROCHLORIDE 30 MG/1
10 TABLET ORAL EVERY 4 HOURS
Refills: 0 | Status: DISCONTINUED | OUTPATIENT
Start: 2025-02-07 | End: 2025-02-07

## 2025-02-07 RX ORDER — POLYETHYLENE GLYCOL 3350 17 G/17G
17 POWDER, FOR SOLUTION ORAL
Qty: 476 | Refills: 0
Start: 2025-02-07 | End: 2025-02-20

## 2025-02-07 RX ORDER — TAMSULOSIN HYDROCHLORIDE 0.4 MG/1
1 CAPSULE ORAL
Qty: 30 | Refills: 0
Start: 2025-02-07 | End: 2025-03-08

## 2025-02-07 RX ORDER — ACETAMINOPHEN 160 MG/5ML
2 SUSPENSION ORAL
Qty: 0 | Refills: 0 | DISCHARGE
Start: 2025-02-07

## 2025-02-07 RX ORDER — HYDROCORTISONE 1 %
1 CREAM (GRAM) TOPICAL
Refills: 0 | Status: DISCONTINUED | OUTPATIENT
Start: 2025-02-07 | End: 2025-02-07

## 2025-02-07 RX ORDER — HYDROCORTISONE 1 %
1 CREAM (GRAM) TOPICAL
Qty: 0 | Refills: 0 | DISCHARGE
Start: 2025-02-07

## 2025-02-07 RX ORDER — NALOXONE HYDROCHLORIDE 3 MG/.1ML
1 SPRAY NASAL
Qty: 1 | Refills: 0
Start: 2025-02-07 | End: 2025-02-07

## 2025-02-07 RX ORDER — PREGABALIN CAPSULES, CV 225 MG/1
1 CAPSULE ORAL
Qty: 42 | Refills: 0
Start: 2025-02-07 | End: 2025-02-20

## 2025-02-07 RX ORDER — OXYCODONE HYDROCHLORIDE 30 MG/1
5 TABLET ORAL EVERY 4 HOURS
Refills: 0 | Status: DISCONTINUED | OUTPATIENT
Start: 2025-02-07 | End: 2025-02-07

## 2025-02-07 RX ORDER — OXYCODONE HYDROCHLORIDE 30 MG/1
1 TABLET ORAL
Qty: 28 | Refills: 0
Start: 2025-02-07 | End: 2025-02-13

## 2025-02-07 RX ORDER — METHOCARBAMOL 500 MG
1 TABLET ORAL
Qty: 21 | Refills: 0
Start: 2025-02-07 | End: 2025-02-13

## 2025-02-07 RX ADMIN — ASPIRIN 81 MILLIGRAM(S): 81 TABLET, COATED ORAL at 13:02

## 2025-02-07 RX ADMIN — Medication 750 MILLIGRAM(S): at 13:05

## 2025-02-07 RX ADMIN — PANTOPRAZOLE 40 MILLIGRAM(S): 20 TABLET, DELAYED RELEASE ORAL at 05:46

## 2025-02-07 RX ADMIN — ACETAMINOPHEN 1000 MILLIGRAM(S): 160 SUSPENSION ORAL at 05:46

## 2025-02-07 RX ADMIN — Medication 2 TABLET(S): at 05:45

## 2025-02-07 RX ADMIN — OXYCODONE HYDROCHLORIDE 10 MILLIGRAM(S): 30 TABLET ORAL at 14:03

## 2025-02-07 RX ADMIN — Medication 10 MILLIGRAM(S): at 05:46

## 2025-02-07 RX ADMIN — OXYCODONE HYDROCHLORIDE 10 MILLIGRAM(S): 30 TABLET ORAL at 13:01

## 2025-02-07 RX ADMIN — Medication 750 MILLIGRAM(S): at 05:46

## 2025-02-07 RX ADMIN — Medication 15 MILLIGRAM(S): at 07:08

## 2025-02-07 RX ADMIN — ACETAMINOPHEN 1000 MILLIGRAM(S): 160 SUSPENSION ORAL at 13:06

## 2025-02-07 RX ADMIN — Medication 1 APPLICATION(S): at 05:47

## 2025-02-07 RX ADMIN — POLYETHYLENE GLYCOL 3350 17 GRAM(S): 17 POWDER, FOR SOLUTION ORAL at 05:45

## 2025-02-07 RX ADMIN — PREGABALIN CAPSULES, CV 75 MILLIGRAM(S): 225 CAPSULE ORAL at 05:46

## 2025-02-07 RX ADMIN — Medication 1 TABLET(S): at 13:01

## 2025-02-07 RX ADMIN — PREGABALIN CAPSULES, CV 75 MILLIGRAM(S): 225 CAPSULE ORAL at 13:05

## 2025-02-07 NOTE — DISCHARGE NOTE NURSING/CASE MANAGEMENT/SOCIAL WORK - FINANCIAL ASSISTANCE
[facility] provides services at a reduced cost to those who are determined to be eligible through [facility]’s financial assistance program. Information regarding [facility]’s financial assistance program can be found by going to [email or web address] or by calling [phone].

## 2025-02-07 NOTE — PROGRESS NOTE ADULT - SUBJECTIVE AND OBJECTIVE BOX
HPI:  Taken from outpatient neurosurgery note " 68 y/o male with hx of HTN coming in for lower back pain. Pain radiating to bilateral posterior lower extremities (L>R). Reporting weakness of the left lower extremity. "  ,   68 y/o male with PMHx DM diet controlled (HgbA1c 6.7%), HTN, GERD, presents for spine surgery.  He reports severe back pain and left greater than right leg pain.  He ambulates with a cane.  He reports numbess into both legs and reports left leg weakness when trying to walk.  He denies bowel or bladder changes, saddle anesthesia.  He has not taken any pain medication in the past month because it hasn't been helping. (03 Feb 2025 10:19)    INTERVAL EVENTS: POD 4, MARIAH    Hospital course:   2/3: POD0 L5-S1 laminectomy, TLIF, PSF.  2/4: POD1, MARIAH overnight. PT/OT rec home PT/OT. Recieved IV dilaudid for pain in evening.  2/5: POD2, MARIAH overnight. bowel regimen increased. SQL prophylaxis started.  2/6: POD 3, HMV removed. Started Flomax.   2/7: POD 4, MARIAH  Vital Signs Last 24 Hrs  T(C): 36.4 (06 Feb 2025 20:20), Max: 36.8 (06 Feb 2025 08:20)  T(F): 97.6 (06 Feb 2025 20:20), Max: 98.2 (06 Feb 2025 08:20)  HR: 62 (06 Feb 2025 20:20) (62 - 68)  BP: 146/79 (06 Feb 2025 20:20) (115/71 - 146/79)  BP(mean): --  RR: 16 (06 Feb 2025 20:20) (16 - 17)  SpO2: 98% (06 Feb 2025 20:20) (93% - 98%)    Parameters below as of 06 Feb 2025 20:20  Patient On (Oxygen Delivery Method): room air        I&O's Summary    05 Feb 2025 07:01  -  06 Feb 2025 07:00  --------------------------------------------------------  IN: 0 mL / OUT: 140 mL / NET: -140 mL    06 Feb 2025 07:01  -  07 Feb 2025 02:49  --------------------------------------------------------  IN: 0 mL / OUT: 10 mL / NET: -10 mL        PHYSICAL EXAM:  General: patient seen laying supine in bed in NAD, Yi speaking  Neuro: AAOx3, follows commands, opens eyes spontaneously, speech clear and fluent,  face symmetric,  strength 5/5 b/l upper extremities and lower extremities, sensation intact to light touch throughout  HEENT: PERRL  Neck: supple  Cardiac: RRR, S1S2  Pulmonary: chest rise symmetric  Abdomen: soft, nontender, nondistended  Ext: perfusing well  Skin: warm, dry  Wound: posterior lumbar incision dressing c/d/i, JESUS x1    TUBES/LINES:  [] [place]  [] A-line  [] Lumbar Drain  [] Wound Drains  [] NGT   [] EVD   [] CVC  [] Other      DIET:  [] NPO  [x] Mechanical  [] Tube feeds    LABS:                        11.8   6.72  )-----------( 166      ( 06 Feb 2025 05:30 )             33.9     02-06    137  |  99  |  16  ----------------------------<  100[H]  4.0   |  28  |  1.04    Ca    8.8      06 Feb 2025 05:30  Phos  4.2     02-06  Mg     2.0     02-06    TPro  6.0  /  Alb  3.5  /  TBili  0.6  /  DBili  x   /  AST  52[H]  /  ALT  94[H]  /  AlkPhos  93  02-06      Urinalysis Basic - ( 06 Feb 2025 05:30 )    Color: x / Appearance: x / SG: x / pH: x  Gluc: 100 mg/dL / Ketone: x  / Bili: x / Urobili: x   Blood: x / Protein: x / Nitrite: x   Leuk Esterase: x / RBC: x / WBC x   Sq Epi: x / Non Sq Epi: x / Bacteria: x          CAPILLARY BLOOD GLUCOSE      POCT Blood Glucose.: 100 mg/dL (06 Feb 2025 21:18)  POCT Blood Glucose.: 113 mg/dL (06 Feb 2025 17:12)  POCT Blood Glucose.: 118 mg/dL (06 Feb 2025 12:06)  POCT Blood Glucose.: 97 mg/dL (06 Feb 2025 06:49)      Drug Levels: [] N/A    CSF Analysis: [] N/A      Allergies    No Known Allergies    Intolerances        Home Medications:  amLODIPine 10 mg oral tablet: 1 tab(s) orally once a day (03 Feb 2025 10:06)  aspirin 81 mg oral delayed release tablet: 1 tab(s) orally once a day (03 Feb 2025 10:27)  omeprazole 40 mg oral delayed release capsule: 1 cap(s) orally once a day (03 Feb 2025 10:27)  valsartan 160 mg oral capsule: orally once a day (03 Feb 2025 10:06)      MEDICATIONS:  MEDICATIONS  (STANDING):  acetaminophen     Tablet .. 1000 milliGRAM(s) Oral every 8 hours  amLODIPine   Tablet 10 milliGRAM(s) Oral daily  aspirin  chewable 81 milliGRAM(s) Oral daily  enoxaparin Injectable 40 milliGRAM(s) SubCutaneous <User Schedule>  insulin lispro (ADMELOG) corrective regimen sliding scale   SubCutaneous Before meals and at bedtime  ketorolac   Injectable 15 milliGRAM(s) IV Push every 12 hours  methocarbamol 750 milliGRAM(s) Oral every 8 hours  multivitamin 1 Tablet(s) Oral daily  pantoprazole    Tablet 40 milliGRAM(s) Oral two times a day  polyethylene glycol 3350 17 Gram(s) Oral two times a day  pregabalin 75 milliGRAM(s) Oral every 8 hours  senna 2 Tablet(s) Oral two times a day  tamsulosin 0.4 milliGRAM(s) Oral at bedtime    MEDICATIONS  (PRN):  oxyCODONE    IR 10 milliGRAM(s) Oral every 4 hours PRN Severe Pain (7 - 10)  oxyCODONE    IR 5 milliGRAM(s) Oral every 4 hours PRN Moderate Pain (4 - 6)      CULTURES:      RADIOLOGY & ADDITIONAL TESTS:      ASSESSMENT:  69M with PMHx HTN, GERD, DM2 diet-controlled presents with lower back pain with pain radiating to bilateral posterior lower extremities (L>R) and associated LLE weakness. Now s/p L5-S1 laminectomy, TLIF, PSF with plastics closure 2/3.     Plan:   Neuro:   - neuro/vitals q8hr  - pain control with spinal eras   - post op xrays completed 2/5  - superficial JESUS per plastics, monitor output     Cards:  - SBP <160  - hx HTN: c/w home amlodipine 5 mg, valsartan 160 mg   - home ASA 81mg daily     Resp:  - RA  - IS     GI:  - consistent carb diet  - bowel regimen, last BM 2/6  - hx GERD: Protonix increased to BID while on Toradol     Renal:  - IVL  - voiding  - start flomax for nocturia     Endo:  - hx T2DM: ISS  - A1C 6.2    Heme:  - SCDs and SQL for DVT prophylaxis    ID:  - afebrile    Dispo: regional, full code, PT/OT rec home PT/OT.    Discussed with Dr. Pizarro      Assessment: present when checked     [] GCS   E   V   M     Heart Failure: [] Acute, [] acute on chronic, [] chronic   Heart Failure: [] Diastolic (HFpEF), [] Systolic (HRrEF), [] Combined (HFpEF and HFrEF), [] RHF, [] Pulm HTN, [] Other     [] JAYA, [] ATN, [] AIN, [] other   [] CKD1, [] CKD2, [] CKD3, [] CKD4, [] CKD5, [] ESRD     Encephalopathy: [] Metabolic, [] Hepatic, [] Toxic, [] Neurological, [] Other     Abnormal Nutritional Status: [] malnutrition (see nutrition note), []underweight: BMI <19, [] morbid obesity: BMI >40, [] Cachexia     [] Sepsis   [] Hypovolemic shock, [] Cardiogenic shock, [] Hemorrhagic shock, [] Neurogenic shock   [] Acute respiratory failure   [] Cerebral edema, [] Brain compression / herniation   [] Functional quadriplegia   [] Acute blood loss anemia

## 2025-02-07 NOTE — PROGRESS NOTE ADULT - REASON FOR ADMISSION
back and leg pain

## 2025-02-07 NOTE — PROGRESS NOTE ADULT - SUBJECTIVE AND OBJECTIVE BOX
Patient is a 69y old  Male who presents with a chief complaint of back and leg pain (06 Feb 2025 00:50)        SUBJECTIVE:  Patient was seen and examined at bedside.    Overnight Events : reports difficulty urinating post surgery,  on further enquire he also reports that he had nocturia prior to surgery     Last Bowel Movement: 06-Feb-2025 (02-07)  Last Bowel Movement: 06-Feb-2025 (02-06)        Review of systems: 12 point Review of systems negative unless otherwise documented elsewhere in note.     Diet, Consistent Carbohydrate w/Evening Snack (02-03-25 @ 18:11) [Active]      MEDICATIONS:  MEDICATIONS  (STANDING):  acetaminophen     Tablet .. 1000 milliGRAM(s) Oral every 8 hours  amLODIPine   Tablet 10 milliGRAM(s) Oral daily  aspirin  chewable 81 milliGRAM(s) Oral daily  enoxaparin Injectable 40 milliGRAM(s) SubCutaneous <User Schedule>  insulin lispro (ADMELOG) corrective regimen sliding scale   SubCutaneous Before meals and at bedtime  ketorolac   Injectable 15 milliGRAM(s) IV Push every 12 hours  methocarbamol 750 milliGRAM(s) Oral every 8 hours  multivitamin 1 Tablet(s) Oral daily  pantoprazole    Tablet 40 milliGRAM(s) Oral two times a day  polyethylene glycol 3350 17 Gram(s) Oral two times a day  pregabalin 75 milliGRAM(s) Oral every 8 hours  senna 2 Tablet(s) Oral two times a day  tamsulosin 0.4 milliGRAM(s) Oral at bedtime    MEDICATIONS  (PRN):  oxyCODONE    IR 10 milliGRAM(s) Oral every 4 hours PRN Severe Pain (7 - 10)  oxyCODONE    IR 5 milliGRAM(s) Oral every 4 hours PRN Moderate Pain (4 - 6)      Allergies    No Known Allergies    Intolerances        OBJECTIVE:  Vital Signs Last 24 Hrs  T(C): 36.5 (07 Feb 2025 08:50), Max: 36.7 (06 Feb 2025 16:36)  T(F): 97.7 (07 Feb 2025 08:50), Max: 98.1 (07 Feb 2025 05:34)  HR: 59 (07 Feb 2025 08:50) (59 - 67)  BP: 116/60 (07 Feb 2025 08:50) (116/60 - 146/79)  BP(mean): --  RR: 17 (07 Feb 2025 08:50) (16 - 17)  SpO2: 98% (07 Feb 2025 08:50) (95% - 98%)    Parameters below as of 07 Feb 2025 08:50  Patient On (Oxygen Delivery Method): room air                PHYSICAL EXAM:  Gen: Resting in bed at time of exam, not in distress   HEENT: moist mucosa, no lesions   Neck: supple, trachea at midline  CV: RRR, +S1/S2  Pulm: no wheezing , no crackles  no increase in work of breathing  Abd: soft, NTND  Skin: warm and dry, no new rashes   Ext: moving all 4 extremities spontaneously , no edema  ,  Neuro: AOx3, no gross focal neurological deficits  Psych: affect and behavior appropriate, pleasant at time of interview    LABS:                                   11.8   6.72  )-----------( 166      ( 06 Feb 2025 05:30 )             33.9     02-06    137  |  99  |  16  ----------------------------<  100[H]  4.0   |  28  |  1.04    Ca    8.8      06 Feb 2025 05:30  Phos  4.2     02-06  Mg     2.0     02-06    TPro  6.0  /  Alb  3.5  /  TBili  0.6  /  DBili  x   /  AST  52[H]  /  ALT  94[H]  /  AlkPhos  93  02-06      CAPILLARY BLOOD GLUCOSE      POCT Blood Glucose.: 118 mg/dL (06 Feb 2025 12:06)  POCT Blood Glucose.: 97 mg/dL (06 Feb 2025 06:49)  POCT Blood Glucose.: 108 mg/dL (05 Feb 2025 21:36)  POCT Blood Glucose.: 92 mg/dL (05 Feb 2025 17:58)          MICRODATA:      RADIOLOGY/OTHER STUDIES:

## 2025-02-07 NOTE — PROGRESS NOTE ADULT - ASSESSMENT
Patient is a 69y old  Male who presents with a chief complaint of back and leg pain (05 Feb 2025 10:36)      Impression and Plan   # Low back pain s/p  L5-S1 Laminectomy , TLIF, PSF with plastics closure   - Weightbearing status , Dressing changes and drain care per NSGY team    # Pre Diabetic , not on meds at home ,  currently blood sugars stable   - if FSG > 180 on 2 consecutive readings , start Tradjenta 5mg once daily   - Continue Sliding Scale Insulin     # HTN   - Continue Amlodipine , Valsartan     # GERD - Pantoprazole     # BPH - Start Tamsulosin      # DVT prophylaxis - Lovenox 
Patient is a 69y old  Male who presents with a chief complaint of back and leg pain (05 Feb 2025 10:36)      Impression and Plan   # Low back pain s/p  L5-S1 Laminectomy , TLIF, PSF with plastics closure   - Weightbearing status , Dressing changes and drain care per NSGY team    # Pre Diabetic , not on meds at home ,  currently blood sugars stable   - if FSG > 180 on 2 consecutive readings , start Tradjenta 5mg once daily   - Continue Sliding Scale Insulin     # HTN   - Continue Amlodipine , Valsartan     # GERD - Pantoprazole     # BPH - Start Tamsulosin      # DVT prophylaxis - Lovenox

## 2025-02-07 NOTE — DISCHARGE NOTE NURSING/CASE MANAGEMENT/SOCIAL WORK - NSDCFUADDAPPT_GEN_ALL_CORE_FT
Please follow up with SAMEER Cabrera (NP with Dr. Pizarro) on 2/21 at 12PM, call the office to make/confirm appointment at [phone]    Please follow up with Dr. Clayton from Plastic surgery    Please follow up with Dr. Ramirez from pain management    Please follow up with your primary care doctor

## 2025-02-07 NOTE — DISCHARGE NOTE NURSING/CASE MANAGEMENT/SOCIAL WORK - PATIENT PORTAL LINK FT
You can access the FollowMyHealth Patient Portal offered by [facility] by registering at the following website: [email or web address]. By joining [employer]’s FollowMyHealth portal, you will also be able to view your health information using other applications (apps) compatible with our system.

## 2025-02-07 NOTE — PROCEDURE NOTE - ADDITIONAL PROCEDURE DETAILS
Bandage was removed from site and chlorhexidine was used for sterilization. The drain was taken off suction and the anchoring suture was cut. The drain was removed and the tip was visualized. Steri strips were used to cover the site.
Site cleansed with chlorhexidine, anchoring stitch removed with suture scissors, drain placed off suction, drain pulled out without difficulty and tip visualized, steri strips placed at exit site. No residual bleeding noted.

## 2025-02-11 DIAGNOSIS — Z79.899 OTHER LONG TERM (CURRENT) DRUG THERAPY: ICD-10-CM

## 2025-02-11 DIAGNOSIS — M47.26 OTHER SPONDYLOSIS WITH RADICULOPATHY, LUMBAR REGION: ICD-10-CM

## 2025-02-11 DIAGNOSIS — R73.03 PREDIABETES: ICD-10-CM

## 2025-02-11 DIAGNOSIS — R35.1 NOCTURIA: ICD-10-CM

## 2025-02-11 DIAGNOSIS — Z79.82 LONG TERM (CURRENT) USE OF ASPIRIN: ICD-10-CM

## 2025-02-11 DIAGNOSIS — M43.17 SPONDYLOLISTHESIS, LUMBOSACRAL REGION: ICD-10-CM

## 2025-02-11 DIAGNOSIS — M48.07 SPINAL STENOSIS, LUMBOSACRAL REGION: ICD-10-CM

## 2025-02-11 DIAGNOSIS — N40.1 BENIGN PROSTATIC HYPERPLASIA WITH LOWER URINARY TRACT SYMPTOMS: ICD-10-CM

## 2025-02-11 DIAGNOSIS — K21.9 GASTRO-ESOPHAGEAL REFLUX DISEASE WITHOUT ESOPHAGITIS: ICD-10-CM

## 2025-02-11 DIAGNOSIS — I10 ESSENTIAL (PRIMARY) HYPERTENSION: ICD-10-CM

## 2025-02-14 RX ORDER — ASPIRIN 81 MG/1
1 TABLET, COATED ORAL
Refills: 0 | DISCHARGE

## 2025-02-14 RX ORDER — VALSARTAN 80 MG
0 TABLET ORAL
Refills: 0 | DISCHARGE

## 2025-02-14 RX ORDER — AMLODIPINE BESYLATE 5 MG
1 TABLET ORAL
Refills: 0 | DISCHARGE

## 2025-02-14 RX ORDER — OMEPRAZOLE 20 MG/1
1 CAPSULE, DELAYED RELEASE ORAL
Refills: 0 | DISCHARGE

## 2025-02-14 NOTE — CHART NOTE - NSCHARTNOTEFT_GEN_A_CORE
Post-Discharge Medication Review: Completed	  	   provided by [employer].   Name: Nita   ID: [id number]	  	  Patient's preferred pharmacy was updated in [facility]: [facility]	  	  Caregiver (Graeme, daughter) contacted to offer medication counseling post-discharge. Medication reconciliation completed. Per Caregiver, medications include:	  	  1.	acetaminophen 500 mg oral tablet 2 tab(s) orally every 8 hours as needed for mild to moderate pain  2.	amLODIPine 10 mg oral tablet 1 tab(s) orally once a day  3.	aspirin 81 mg oral delayed release tablet 1 tab(s) orally once a day  4.	cyclobenzaprine 10 mg oral tablet 1 tab(s) orally every 8 hours as needed for muscle spasm MDD: 3 tabs  5.	hydrocortisone 1% topical cream 1 Apply topically to affected area 2 times a day As needed Itching  6.	Narcan 4 mg/0.1 mL nasal spray 1 spray(s) intranasally once a day as needed for opioid overdose  7.	omeprazole 40 mg oral delayed release capsule 1 cap(s) orally once a day  8.	oxyCODONE 5 mg oral tablet 1 tab(s) orally every 6 hours as needed for severe pain MDD: 4 tabs  9.	polyethylene glycol 3350 oral powder for reconstitution 17 gram(s) orally 2 times a day as needed for constipation  10.	pregabalin 75 mg oral capsule 1 cap(s) orally every 8 hours wean off as tolerated MDD: 3 tabs  11.	tamsulosin 0.4 mg oral capsule 1 cap(s) orally once a day (at bedtime)  12.	valsartan 160 mg oral capsule orally once a day  	  Medication name, indication, administration, side effects, and monitoring reviewed for new medications during post discharge counseling visit with Caregiver. Caregiver demonstrated understanding. Counseling offered for all medications.	  	  Caregiver was unsure of the frequency of the aspirin patient is currently taking and will confirm with patient's spouse. She will reach out to the patient's PCP to confirm dose if needed.	    Xin Cervantes, Stiven	  Clinical Pharmacy Specialist, Pharmacy Telehealth Team	  Can be reached via [employer] Teams or [phone]

## 2025-02-21 ENCOUNTER — APPOINTMENT (OUTPATIENT)
Dept: SPINE | Facility: CLINIC | Age: 70
End: 2025-02-21
Payer: MEDICAID

## 2025-02-21 DIAGNOSIS — Z98.1 ARTHRODESIS STATUS: ICD-10-CM

## 2025-02-21 PROCEDURE — 99024 POSTOP FOLLOW-UP VISIT: CPT

## 2025-03-27 ENCOUNTER — OUTPATIENT (OUTPATIENT)
Dept: OUTPATIENT SERVICES | Facility: HOSPITAL | Age: 70
LOS: 1 days | End: 2025-03-27
Payer: MEDICAID

## 2025-03-27 ENCOUNTER — APPOINTMENT (OUTPATIENT)
Dept: SPINE | Facility: CLINIC | Age: 70
End: 2025-03-27
Payer: MEDICARE

## 2025-03-27 VITALS
OXYGEN SATURATION: 98 % | WEIGHT: 188 LBS | RESPIRATION RATE: 18 BRPM | SYSTOLIC BLOOD PRESSURE: 122 MMHG | BODY MASS INDEX: 27.85 KG/M2 | DIASTOLIC BLOOD PRESSURE: 79 MMHG | HEIGHT: 69 IN | HEART RATE: 78 BPM | TEMPERATURE: 97.5 F

## 2025-03-27 DIAGNOSIS — Z98.1 ARTHRODESIS STATUS: ICD-10-CM

## 2025-03-27 DIAGNOSIS — Z98.890 OTHER SPECIFIED POSTPROCEDURAL STATES: Chronic | ICD-10-CM

## 2025-03-27 DIAGNOSIS — M43.17 SPONDYLOLISTHESIS, LUMBOSACRAL REGION: ICD-10-CM

## 2025-03-27 PROCEDURE — 72082 X-RAY EXAM ENTIRE SPI 2/3 VW: CPT

## 2025-03-27 PROCEDURE — 72082 X-RAY EXAM ENTIRE SPI 2/3 VW: CPT | Mod: 26

## 2025-03-27 PROCEDURE — 99024 POSTOP FOLLOW-UP VISIT: CPT

## 2025-05-15 ENCOUNTER — APPOINTMENT (OUTPATIENT)
Dept: SPINE | Facility: CLINIC | Age: 70
End: 2025-05-15

## 2025-05-15 DIAGNOSIS — Z98.1 ARTHRODESIS STATUS: ICD-10-CM

## 2025-05-15 DIAGNOSIS — M43.17 SPONDYLOLISTHESIS, LUMBOSACRAL REGION: ICD-10-CM

## 2025-05-29 ENCOUNTER — APPOINTMENT (OUTPATIENT)
Dept: SPINE | Facility: CLINIC | Age: 70
End: 2025-05-29

## 2025-06-05 ENCOUNTER — APPOINTMENT (OUTPATIENT)
Dept: SPINE | Facility: CLINIC | Age: 70
End: 2025-06-05
Payer: MEDICARE

## 2025-06-05 VITALS
OXYGEN SATURATION: 97 % | TEMPERATURE: 97.4 F | SYSTOLIC BLOOD PRESSURE: 152 MMHG | HEIGHT: 69 IN | RESPIRATION RATE: 18 BRPM | HEART RATE: 70 BPM | DIASTOLIC BLOOD PRESSURE: 77 MMHG | BODY MASS INDEX: 28.88 KG/M2 | WEIGHT: 195 LBS

## 2025-06-05 DIAGNOSIS — Z98.1 ARTHRODESIS STATUS: ICD-10-CM

## 2025-06-05 PROCEDURE — 99213 OFFICE O/P EST LOW 20 MIN: CPT

## (undated) DEVICE — WOUND IRR SURGIPHOR

## (undated) DEVICE — GLV 7.5 PROTEXIS (WHITE)

## (undated) DEVICE — DRAPE LIGHT HANDLE COVER (GREEN)

## (undated) DEVICE — STRYKER INSTRUMENT BATTERY

## (undated) DEVICE — DRAPE GENERAL ENDOSCOPY

## (undated) DEVICE — DRAPE 1/2 SHEET 40X57"

## (undated) DEVICE — SUT MONOCRYL 3-0 18" PS-2 UNDYED

## (undated) DEVICE — GLV 8 PROTEXIS ORTHO (CREAM)

## (undated) DEVICE — Device

## (undated) DEVICE — SUT VICRYL PLUS 0 36" CT-1

## (undated) DEVICE — SUT ETHILON 3-0 18" PS-1

## (undated) DEVICE — MIDAS REX MR8 MATCH HEAD FLUTED SM BORE 3MM X 10CM

## (undated) DEVICE — SUT VICRYL PLUS 2-0 18" CT-2 (POP-OFF)

## (undated) DEVICE — NEURO SURGICAL STRIP 1/4 X 6"

## (undated) DEVICE — GLV 8 PROTEXIS (WHITE)

## (undated) DEVICE — DRSG TEGADERM 4 X 4.75"

## (undated) DEVICE — DRAPE TOP SHEET 53" X 101"

## (undated) DEVICE — FOLEY TRAY 16FR 5CC LF UMETER CLOSED

## (undated) DEVICE — DRAPE FOR 2 TIER TABLE W/ 8" BACK 72" LONG

## (undated) DEVICE — MARKING PEN W RULER

## (undated) DEVICE — DRAPE INSTRUMENT POUCH 6.75" X 11"

## (undated) DEVICE — ELCTR STRYKER NEPTUNE SMOKE EVACUATION PENCIL (GREEN)

## (undated) DEVICE — DRAPE IOBAN 33" X 23"

## (undated) DEVICE — DRAIN JACKSON PRATT 3 SPRING RESERVOIR W 10FR PVC DRAIN

## (undated) DEVICE — DRSG DERMABOND PRINEO 22CM

## (undated) DEVICE — NDL SPINAL 18G X 3.5" (PINK)

## (undated) DEVICE — STAPLER SKIN PROXIMATE

## (undated) DEVICE — NDL HYPO SAFE 22G X 1.5" (BLACK)

## (undated) DEVICE — NEPTUNE 4-PORT MANIFOLD STANDARD

## (undated) DEVICE — PREP CHLORAPREP HI-LITE ORANGE 26ML

## (undated) DEVICE — MIDAS REX MR8 MATCH HEAD FLUTED LG BORE 3MM X 14CM

## (undated) DEVICE — PACK SPINE

## (undated) DEVICE — DRAPE 3/4 SHEET 52X76"

## (undated) DEVICE — POLISHER OR CAUTERY TIP

## (undated) DEVICE — ELCTR AQUAMANTYS BIPOLAR SEALER 6.0

## (undated) DEVICE — DRSG AQUACEL 3.5 X 10"

## (undated) DEVICE — DRSG TELFA 3 X 8